# Patient Record
Sex: MALE | Race: WHITE | NOT HISPANIC OR LATINO | Employment: FULL TIME | URBAN - NONMETROPOLITAN AREA
[De-identification: names, ages, dates, MRNs, and addresses within clinical notes are randomized per-mention and may not be internally consistent; named-entity substitution may affect disease eponyms.]

---

## 2021-06-18 ENCOUNTER — HOSPITAL ENCOUNTER (OUTPATIENT)
Dept: GENERAL RADIOLOGY | Facility: HOSPITAL | Age: 36
Discharge: HOME | End: 2021-06-18
Admitting: FAMILY MEDICINE

## 2021-06-18 DIAGNOSIS — R51.9 FACIAL PAIN: ICD-10-CM

## 2021-06-18 PROCEDURE — 70150 X-RAY EXAM OF FACIAL BONES: CPT | Performed by: RADIOLOGY

## 2021-06-18 PROCEDURE — 70150 X-RAY EXAM OF FACIAL BONES: CPT

## 2023-03-27 ENCOUNTER — TRANSCRIBE ORDERS (OUTPATIENT)
Dept: ADMINISTRATIVE | Facility: HOSPITAL | Age: 38
End: 2023-03-27
Payer: COMMERCIAL

## 2023-03-27 DIAGNOSIS — N50.89 TESTICLE LUMP: Primary | ICD-10-CM

## 2024-11-20 ENCOUNTER — OFFICE VISIT (OUTPATIENT)
Dept: ORTHOPEDIC SURGERY | Facility: CLINIC | Age: 39
End: 2024-11-20
Payer: MEDICAID

## 2024-11-20 VITALS
WEIGHT: 254.4 LBS | OXYGEN SATURATION: 95 % | HEIGHT: 77 IN | DIASTOLIC BLOOD PRESSURE: 86 MMHG | SYSTOLIC BLOOD PRESSURE: 132 MMHG | BODY MASS INDEX: 30.04 KG/M2 | HEART RATE: 88 BPM

## 2024-11-20 DIAGNOSIS — M17.12 PRIMARY OSTEOARTHRITIS OF LEFT KNEE: ICD-10-CM

## 2024-11-20 DIAGNOSIS — M25.562 CHRONIC PAIN OF LEFT KNEE: Primary | ICD-10-CM

## 2024-11-20 DIAGNOSIS — M25.462 EFFUSION OF LEFT KNEE: ICD-10-CM

## 2024-11-20 DIAGNOSIS — S00.95XA: ICD-10-CM

## 2024-11-20 DIAGNOSIS — M25.562 KNEE MENISCUS PAIN, LEFT: ICD-10-CM

## 2024-11-20 DIAGNOSIS — G89.29 CHRONIC PAIN OF LEFT KNEE: Primary | ICD-10-CM

## 2024-11-20 RX ORDER — NAPROXEN 500 MG/1
500 TABLET ORAL 2 TIMES DAILY WITH MEALS
Qty: 60 TABLET | Refills: 2 | Status: SHIPPED | OUTPATIENT
Start: 2024-11-20

## 2024-11-20 RX ORDER — LIDOCAINE HYDROCHLORIDE 10 MG/ML
5 INJECTION, SOLUTION EPIDURAL; INFILTRATION; INTRACAUDAL; PERINEURAL
Status: COMPLETED | OUTPATIENT
Start: 2024-11-20 | End: 2024-11-20

## 2024-11-20 RX ORDER — METHYLPREDNISOLONE ACETATE 40 MG/ML
40 INJECTION, SUSPENSION INTRA-ARTICULAR; INTRALESIONAL; INTRAMUSCULAR; SOFT TISSUE
Status: COMPLETED | OUTPATIENT
Start: 2024-11-20 | End: 2024-11-20

## 2024-11-20 RX ADMIN — LIDOCAINE HYDROCHLORIDE 5 ML: 10 INJECTION, SOLUTION EPIDURAL; INFILTRATION; INTRACAUDAL; PERINEURAL at 14:52

## 2024-11-20 RX ADMIN — METHYLPREDNISOLONE ACETATE 40 MG: 40 INJECTION, SUSPENSION INTRA-ARTICULAR; INTRALESIONAL; INTRAMUSCULAR; SOFT TISSUE at 14:52
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
## 2024-11-22 ENCOUNTER — PATIENT ROUNDING (BHMG ONLY) (OUTPATIENT)
Dept: ORTHOPEDIC SURGERY | Facility: CLINIC | Age: 39
End: 2024-11-22
Payer: MEDICAID

## 2024-11-22 NOTE — PROGRESS NOTES
November 22, 2024    Hello, may I speak with Brennan Lao?    My name is AVE      I am  with [facility]  [address]  [facility] [address] [phone].    Before we get started may I verify your date of birth? 1985    I am calling to officially welcome you to our practice and ask about your recent visit. Is this a good time to talk? yes    Tell me about your visit with us. What things went well?  ALL WENT WELL, NO COMPLAINTS       We're always looking for ways to make our patients' experiences even better. Do you have recommendations on ways we may improve?  no    Overall were you satisfied with your first visit to our practice? yes       I appreciate you taking the time to speak with me today. Is there anything else I can do for you? no      Thank you, and have a great day.

## 2024-12-03 ENCOUNTER — OFFICE VISIT (OUTPATIENT)
Dept: ORTHOPEDIC SURGERY | Facility: CLINIC | Age: 39
End: 2024-12-03
Payer: MEDICAID

## 2024-12-03 VITALS
BODY MASS INDEX: 30.06 KG/M2 | HEIGHT: 77 IN | SYSTOLIC BLOOD PRESSURE: 161 MMHG | WEIGHT: 254.6 LBS | HEART RATE: 85 BPM | DIASTOLIC BLOOD PRESSURE: 100 MMHG

## 2024-12-03 DIAGNOSIS — M25.562 KNEE MENISCUS PAIN, LEFT: ICD-10-CM

## 2024-12-03 DIAGNOSIS — M25.462 EFFUSION OF LEFT KNEE: ICD-10-CM

## 2024-12-03 DIAGNOSIS — G89.29 CHRONIC PAIN OF LEFT KNEE: Primary | ICD-10-CM

## 2024-12-03 DIAGNOSIS — M17.12 PRIMARY OSTEOARTHRITIS OF LEFT KNEE: ICD-10-CM

## 2024-12-03 DIAGNOSIS — M25.562 CHRONIC PAIN OF LEFT KNEE: Primary | ICD-10-CM

## 2024-12-03 DIAGNOSIS — S00.95XA: ICD-10-CM

## 2024-12-03 DIAGNOSIS — Z84.0 FAMILY HISTORY OF PSORIASIS IN MOTHER: ICD-10-CM

## 2024-12-03 PROCEDURE — 99214 OFFICE O/P EST MOD 30 MIN: CPT | Performed by: FAMILY MEDICINE

## 2024-12-03 RX ORDER — MELOXICAM 7.5 MG/1
7.5 TABLET ORAL DAILY
Qty: 30 TABLET | Refills: 1 | Status: SHIPPED | OUTPATIENT
Start: 2024-12-03

## 2024-12-03 NOTE — PROGRESS NOTES
"Follow Up Visit      Patient: Brennan Lao  YOB: 1985  Date of Encounter: 12/03/2024  PCP: Rizwana Saha MD  Referring Provider: No ref. provider found     Subjective   Brennan Lao is a 39 y.o. male who presents to the office today for evaluation of Edema, Pain, and Follow-up of the Left Knee      Chief Complaint   Patient presents with    Left Knee - Edema, Pain, Follow-up       HPI  Patient presents for follow-up for left knee pain.  The steroid injection he had at the last visit has been very helpful for the pain but still feels like the knee is in stable and is not moving well.  Notes that the naproxen is not particularly helpful and that he has been taking it a long time.  Knee brace is not helpful.  Notes his mom has a strong history of psoriasis but he does not have any signs of the disease  Patient Active Problem List   Diagnosis    Metal foreign body in head    Primary osteoarthritis of left knee    Chronic pain of left knee       Past Medical History:   Diagnosis Date    Ankle sprain N/A    When I played basketball in [place]    Fracture of ankle N/A    Knee sprain N/A    Low back strain N/A    Stress fracture N/A    Tear of meniscus of knee 2024    Both knees    Tendinitis of knee 2024    Both knee    Wrist sprain     N/a       No Known Allergies    Vitals:   /100 (BP Location: Left arm, Patient Position: Sitting, Cuff Size: Adult)   Pulse 85   Ht 195.6 cm (77.01\")   Wt 115 kg (254 lb 9.6 oz)   BMI 30.18 kg/m²               Visit Vitals  /100 (BP Location: Left arm, Patient Position: Sitting, Cuff Size: Adult)   Pulse 85   Ht 195.6 cm (77.01\")   Wt 115 kg (254 lb 9.6 oz)   BMI 30.18 kg/m²     39 y.o.male  Physical Exam  Vitals and nursing note reviewed.   Constitutional:       General: He is not in acute distress.     Appearance: Normal appearance.   Pulmonary:      Effort: Pulmonary effort is normal. No respiratory distress.   Musculoskeletal:      Left " knee: Bony tenderness and crepitus present. Decreased range of motion. Tenderness present over the medial joint line. No LCL laxity, MCL laxity, ACL laxity or PCL laxity.Abnormal meniscus. Normal pulse.      Instability Tests: Anterior drawer test negative. Posterior drawer test negative. Anterior Lachman test negative. Medial Windy test positive.      Comments: Tender medial patellar facet   Skin:     General: Skin is warm and dry.      Findings: No erythema.   Neurological:      General: No focal deficit present.      Mental Status: He is alert.      Sensory: No sensory deficit.      Motor: No weakness.         Radiology Results:    No radiology results for the last 30 days.    Assessment & Plan   Diagnoses and all orders for this visit:    1. Chronic pain of left knee (Primary)  -     14.3.3 ETA, Rheum. Arthritis; Future  -     CHRIS Comprehensive Plus Profile; Future  -     CBC & Differential; Future  -     Comprehensive Metabolic Panel; Future  -     C-reactive Protein; Future  -     Uric Acid; Future  -     Sedimentation Rate; Future  -     Rheumatoid Factor; Future  -     Cyclic Citrul Peptide Antibody, IgG / IgA; Future  -     meloxicam (MOBIC) 7.5 MG tablet; Take 1 tablet by mouth Daily.  Dispense: 30 tablet; Refill: 1  -     Ambulatory Referral to Physical Therapy for Evaluation & Treatment    2. Primary osteoarthritis of left knee  -     14.3.3 ETA, Rheum. Arthritis; Future  -     CHRIS Comprehensive Plus Profile; Future  -     CBC & Differential; Future  -     Comprehensive Metabolic Panel; Future  -     C-reactive Protein; Future  -     Uric Acid; Future  -     Sedimentation Rate; Future  -     Rheumatoid Factor; Future  -     Cyclic Citrul Peptide Antibody, IgG / IgA; Future  -     meloxicam (MOBIC) 7.5 MG tablet; Take 1 tablet by mouth Daily.  Dispense: 30 tablet; Refill: 1  -     Ambulatory Referral to Physical Therapy for Evaluation & Treatment    3. Metal foreign body in head    4. Knee meniscus  pain, left  -     14.3.3 ETA, Rheum. Arthritis; Future  -     CHRIS Comprehensive Plus Profile; Future  -     CBC & Differential; Future  -     Comprehensive Metabolic Panel; Future  -     C-reactive Protein; Future  -     Uric Acid; Future  -     Sedimentation Rate; Future  -     Rheumatoid Factor; Future  -     Cyclic Citrul Peptide Antibody, IgG / IgA; Future  -     meloxicam (MOBIC) 7.5 MG tablet; Take 1 tablet by mouth Daily.  Dispense: 30 tablet; Refill: 1  -     Ambulatory Referral to Physical Therapy for Evaluation & Treatment    5. Effusion of left knee  -     14.3.3 ETA, Rheum. Arthritis; Future  -     CHRIS Comprehensive Plus Profile; Future  -     CBC & Differential; Future  -     Comprehensive Metabolic Panel; Future  -     C-reactive Protein; Future  -     Uric Acid; Future  -     Sedimentation Rate; Future  -     Rheumatoid Factor; Future  -     Cyclic Citrul Peptide Antibody, IgG / IgA; Future  -     meloxicam (MOBIC) 7.5 MG tablet; Take 1 tablet by mouth Daily.  Dispense: 30 tablet; Refill: 1  -     Ambulatory Referral to Physical Therapy for Evaluation & Treatment    6. Family history of psoriasis in mother  -     14.3.3 ETA, Rheum. Arthritis; Future  -     CHRIS Comprehensive Plus Profile; Future  -     CBC & Differential; Future  -     Comprehensive Metabolic Panel; Future  -     C-reactive Protein; Future  -     Uric Acid; Future  -     Sedimentation Rate; Future  -     Rheumatoid Factor; Future  -     Cyclic Citrul Peptide Antibody, IgG / IgA; Future  -     meloxicam (MOBIC) 7.5 MG tablet; Take 1 tablet by mouth Daily.  Dispense: 30 tablet; Refill: 1  -     Ambulatory Referral to Physical Therapy for Evaluation & Treatment         MEDS ORDERED DURING VISIT:  New Medications Ordered This Visit   Medications    meloxicam (MOBIC) 7.5 MG tablet     Sig: Take 1 tablet by mouth Daily.     Dispense:  30 tablet     Refill:  1     MEDICATION ISSUES:  Discussed medication options and treatment plan of  prescribed medication as well as the risks, benefits, and side effects including potential falls, possible impaired driving and metabolic adversities among others. Patient is agreeable to call the office with any worsening of symptoms or onset of side effects. Patient is agreeable to call 911 or go to the nearest ER should he/she begin having SI/HI.     Discussion:  Patient has arthritis in his knees out of proportion to his age but states that he has been very hard on his body over the course of his life.  Recommend rheumatoid lab workup to rule out secondary causes of arthritis.  Recommend changing naproxen to meloxicam.  Patient would also benefit from viscosupplementation injections to try to help with his arthritic pain and movement.  He will be precertified for these and follow-up with the injection             This document has been electronically signed by Holger Quintana DO   December 3, 2024 15:40 EST    Dictated Utilizing Dragon Dictation: Part of this note may be an electronic transcription/translation of spoken language to printed text using the Dragon Dictation System.

## 2024-12-05 ENCOUNTER — LAB (OUTPATIENT)
Dept: LAB | Facility: HOSPITAL | Age: 39
End: 2024-12-05
Payer: MEDICAID

## 2024-12-05 ENCOUNTER — HOSPITAL ENCOUNTER (OUTPATIENT)
Dept: GENERAL RADIOLOGY | Facility: HOSPITAL | Age: 39
Discharge: HOME | End: 2024-12-05
Payer: MEDICAID

## 2024-12-05 DIAGNOSIS — M25.562 CHRONIC PAIN OF LEFT KNEE: ICD-10-CM

## 2024-12-05 DIAGNOSIS — Z84.0 FAMILY HISTORY OF PSORIASIS IN MOTHER: ICD-10-CM

## 2024-12-05 DIAGNOSIS — M25.462 EFFUSION OF LEFT KNEE: ICD-10-CM

## 2024-12-05 DIAGNOSIS — M25.562 KNEE MENISCUS PAIN, LEFT: ICD-10-CM

## 2024-12-05 DIAGNOSIS — M17.12 PRIMARY OSTEOARTHRITIS OF LEFT KNEE: ICD-10-CM

## 2024-12-05 DIAGNOSIS — M25.562 LEFT KNEE PAIN, UNSPECIFIED CHRONICITY: ICD-10-CM

## 2024-12-05 DIAGNOSIS — G89.29 CHRONIC PAIN OF LEFT KNEE: ICD-10-CM

## 2024-12-05 PROCEDURE — 86235 NUCLEAR ANTIGEN ANTIBODY: CPT

## 2024-12-05 PROCEDURE — 36415 COLL VENOUS BLD VENIPUNCTURE: CPT

## 2024-12-05 PROCEDURE — 85025 COMPLETE CBC W/AUTO DIFF WBC: CPT

## 2024-12-05 PROCEDURE — 83520 IMMUNOASSAY QUANT NOS NONAB: CPT

## 2024-12-05 PROCEDURE — 86431 RHEUMATOID FACTOR QUANT: CPT

## 2024-12-05 PROCEDURE — 84550 ASSAY OF BLOOD/URIC ACID: CPT

## 2024-12-05 PROCEDURE — 73562 X-RAY EXAM OF KNEE 3: CPT

## 2024-12-05 PROCEDURE — 83516 IMMUNOASSAY NONANTIBODY: CPT

## 2024-12-05 PROCEDURE — 85652 RBC SED RATE AUTOMATED: CPT

## 2024-12-05 PROCEDURE — 80053 COMPREHEN METABOLIC PANEL: CPT

## 2024-12-05 PROCEDURE — 86140 C-REACTIVE PROTEIN: CPT

## 2024-12-05 PROCEDURE — 86225 DNA ANTIBODY NATIVE: CPT

## 2024-12-05 PROCEDURE — 86200 CCP ANTIBODY: CPT

## 2024-12-06 ENCOUNTER — TELEPHONE (OUTPATIENT)
Dept: ORTHOPEDIC SURGERY | Facility: CLINIC | Age: 39
End: 2024-12-06
Payer: MEDICAID

## 2024-12-06 LAB
ALBUMIN SERPL-MCNC: 4 G/DL (ref 3.5–5.2)
ALBUMIN/GLOB SERPL: 1.1 G/DL
ALP SERPL-CCNC: 82 U/L (ref 39–117)
ALT SERPL W P-5'-P-CCNC: 74 U/L (ref 1–41)
ANION GAP SERPL CALCULATED.3IONS-SCNC: 11.3 MMOL/L (ref 5–15)
AST SERPL-CCNC: 49 U/L (ref 1–40)
BASOPHILS # BLD AUTO: 0.05 10*3/MM3 (ref 0–0.2)
BASOPHILS NFR BLD AUTO: 0.3 % (ref 0–1.5)
BILIRUB SERPL-MCNC: 1.3 MG/DL (ref 0–1.2)
BUN SERPL-MCNC: 8 MG/DL (ref 6–20)
BUN/CREAT SERPL: 8.4 (ref 7–25)
CALCIUM SPEC-SCNC: 9.4 MG/DL (ref 8.6–10.5)
CHLORIDE SERPL-SCNC: 102 MMOL/L (ref 98–107)
CHROMATIN AB SERPL-ACNC: 10.4 IU/ML (ref 0–14)
CO2 SERPL-SCNC: 27.7 MMOL/L (ref 22–29)
CREAT SERPL-MCNC: 0.95 MG/DL (ref 0.76–1.27)
CRP SERPL-MCNC: 1.17 MG/DL (ref 0–0.5)
DEPRECATED RDW RBC AUTO: 41.8 FL (ref 37–54)
EGFRCR SERPLBLD CKD-EPI 2021: 105.1 ML/MIN/1.73
EOSINOPHIL # BLD AUTO: 0.24 10*3/MM3 (ref 0–0.4)
EOSINOPHIL NFR BLD AUTO: 1.6 % (ref 0.3–6.2)
ERYTHROCYTE [DISTWIDTH] IN BLOOD BY AUTOMATED COUNT: 12.9 % (ref 12.3–15.4)
ERYTHROCYTE [SEDIMENTATION RATE] IN BLOOD: 29 MM/HR (ref 0–15)
GLOBULIN UR ELPH-MCNC: 3.6 GM/DL
GLUCOSE SERPL-MCNC: 92 MG/DL (ref 65–99)
HCT VFR BLD AUTO: 46.9 % (ref 37.5–51)
HGB BLD-MCNC: 16 G/DL (ref 13–17.7)
IMM GRANULOCYTES # BLD AUTO: 0.15 10*3/MM3 (ref 0–0.05)
IMM GRANULOCYTES NFR BLD AUTO: 1 % (ref 0–0.5)
LYMPHOCYTES # BLD AUTO: 3.17 10*3/MM3 (ref 0.7–3.1)
LYMPHOCYTES NFR BLD AUTO: 21 % (ref 19.6–45.3)
MCH RBC QN AUTO: 30.4 PG (ref 26.6–33)
MCHC RBC AUTO-ENTMCNC: 34.1 G/DL (ref 31.5–35.7)
MCV RBC AUTO: 89.2 FL (ref 79–97)
MONOCYTES # BLD AUTO: 1.33 10*3/MM3 (ref 0.1–0.9)
MONOCYTES NFR BLD AUTO: 8.8 % (ref 5–12)
NEUTROPHILS NFR BLD AUTO: 10.17 10*3/MM3 (ref 1.7–7)
NEUTROPHILS NFR BLD AUTO: 67.3 % (ref 42.7–76)
NRBC BLD AUTO-RTO: 0 /100 WBC (ref 0–0.2)
PLATELET # BLD AUTO: 281 10*3/MM3 (ref 140–450)
PMV BLD AUTO: 11.9 FL (ref 6–12)
POTASSIUM SERPL-SCNC: 4.2 MMOL/L (ref 3.5–5.2)
PROT SERPL-MCNC: 7.6 G/DL (ref 6–8.5)
RBC # BLD AUTO: 5.26 10*6/MM3 (ref 4.14–5.8)
SODIUM SERPL-SCNC: 141 MMOL/L (ref 136–145)
URATE SERPL-MCNC: 7.6 MG/DL (ref 3.4–7)
WBC NRBC COR # BLD AUTO: 15.11 10*3/MM3 (ref 3.4–10.8)

## 2024-12-06 NOTE — PROGRESS NOTES
Patient needs a follow-up to discuss his abnormal lab results.  His white blood count is somewhat elevated.  Has he been sick or on steroids recently besides the shot I gave him? Is the knee red hot or swollen? Needs to schedule followup.

## 2024-12-06 NOTE — TELEPHONE ENCOUNTER
Spoke with patient's mother regarding his lab results.  He is unaware of any acute illnesses that can raise his white blood count.  States that the knee is basically at baseline which has been painful and somewhat warm for several years now and is no worse after the recent injection.  He has a stiff when he first wakes up in the morning but it loosens up as he gets moving.  He denies any fevers.  Caution patient to watch for signs of worsening redness and swelling or fevers and he should go to the ER if this occurs.  Otherwise he will follow-up next week for further evaluation.  Discussed that if symptoms persist we will potentially need to aspirate the knee and send the fluid off for evaluation.  Consider treating for gout.  Discussed that his x-ray was WNL.  Patient does not describe any symptoms of acute septic joint at this time.

## 2024-12-07 LAB
CCP IGA+IGG SERPL IA-ACNC: 7 UNITS (ref 0–19)
CENTROMERE B AB SER-ACNC: <0.2 AI (ref 0–0.9)
CHROMATIN AB SERPL-ACNC: <0.2 AI (ref 0–0.9)
DSDNA AB SER-ACNC: 1 IU/ML (ref 0–9)
ENA JO1 AB SER-ACNC: <0.2 AI (ref 0–0.9)
ENA RNP AB SER-ACNC: <0.2 AI (ref 0–0.9)
ENA SCL70 AB SER-ACNC: <0.2 AI (ref 0–0.9)
ENA SM AB SER-ACNC: <0.2 AI (ref 0–0.9)
ENA SM+RNP AB SER-ACNC: <0.2 AI (ref 0–0.9)
ENA SS-A AB SER-ACNC: <0.2 AI (ref 0–0.9)
ENA SS-B AB SER-ACNC: <0.2 AI (ref 0–0.9)
Lab: NORMAL
RIBOSOMAL P AB SER-ACNC: <0.2 AI (ref 0–0.9)

## 2024-12-13 LAB — 14-3-3 ETA AG SER IA-MCNC: <0.2 NG/ML

## 2024-12-20 ENCOUNTER — OFFICE VISIT (OUTPATIENT)
Dept: ORTHOPEDIC SURGERY | Facility: CLINIC | Age: 39
End: 2024-12-20
Payer: MEDICAID

## 2024-12-20 VITALS
DIASTOLIC BLOOD PRESSURE: 81 MMHG | HEART RATE: 89 BPM | SYSTOLIC BLOOD PRESSURE: 144 MMHG | BODY MASS INDEX: 30.1 KG/M2 | OXYGEN SATURATION: 95 % | HEIGHT: 77 IN | WEIGHT: 254.9 LBS

## 2024-12-20 DIAGNOSIS — M25.462 EFFUSION OF LEFT KNEE: ICD-10-CM

## 2024-12-20 DIAGNOSIS — M25.562 KNEE MENISCUS PAIN, LEFT: ICD-10-CM

## 2024-12-20 DIAGNOSIS — R79.89 ELEVATED LFTS: ICD-10-CM

## 2024-12-20 DIAGNOSIS — G89.29 CHRONIC PAIN OF LEFT KNEE: Primary | ICD-10-CM

## 2024-12-20 DIAGNOSIS — M17.12 PRIMARY OSTEOARTHRITIS OF LEFT KNEE: ICD-10-CM

## 2024-12-20 DIAGNOSIS — E79.0 ELEVATED URIC ACID IN BLOOD: ICD-10-CM

## 2024-12-20 DIAGNOSIS — D72.829 LEUKOCYTOSIS, UNSPECIFIED TYPE: ICD-10-CM

## 2024-12-20 DIAGNOSIS — M25.562 CHRONIC PAIN OF LEFT KNEE: Primary | ICD-10-CM

## 2024-12-20 DIAGNOSIS — Z84.0 FAMILY HISTORY OF PSORIASIS IN MOTHER: ICD-10-CM

## 2024-12-20 DIAGNOSIS — S00.95XA: ICD-10-CM

## 2024-12-20 RX ORDER — INDOMETHACIN 50 MG/1
CAPSULE ORAL
Qty: 30 CAPSULE | Refills: 1 | Status: SHIPPED | OUTPATIENT
Start: 2024-12-20

## 2024-12-20 RX ADMIN — LIDOCAINE HYDROCHLORIDE 5 ML: 10 INJECTION, SOLUTION EPIDURAL; INFILTRATION; INTRACAUDAL; PERINEURAL at 11:06

## 2025-01-01 RX ORDER — LIDOCAINE HYDROCHLORIDE 10 MG/ML
5 INJECTION, SOLUTION EPIDURAL; INFILTRATION; INTRACAUDAL; PERINEURAL
Status: COMPLETED | OUTPATIENT
Start: 2024-12-20 | End: 2024-12-20

## 2025-01-02 NOTE — PROGRESS NOTES
"Follow Up Visit      Patient: Brennan Lao  YOB: 1985  Date of Encounter: 12/20/2024  PCP: Rizwana Saha MD  Referring Provider: No ref. provider found     Subjective   Brennan Lao is a 39 y.o. male who presents to the office today for evaluation of Follow-up, Edema, and Pain of the Left Knee      Chief Complaint   Patient presents with    Left Knee - Follow-up, Edema, Pain       HPI  Patient presents for follow-up for left knee pain he states is overall unchanged.  Desires to try Durolane injection if possible.  Got his lab results over the phone but needs to go over them in more detail  Patient Active Problem List   Diagnosis    Metal foreign body in head    Primary osteoarthritis of left knee    Chronic pain of left knee       Past Medical History:   Diagnosis Date    Ankle sprain N/A    When I played basketball in [employer]    Fracture of ankle N/A    Knee sprain N/A    Low back strain N/A    Stress fracture N/A    Tear of meniscus of knee 2024    Both knees    Tendinitis of knee 2024    Both knee    Wrist sprain     N/a       No Known Allergies    Vitals:   /81 (BP Location: Left arm, Patient Position: Sitting, Cuff Size: Adult)   Pulse 89   Ht 195.6 cm (77.01\")   Wt 116 kg (254 lb 14.4 oz)   SpO2 95%   BMI 30.22 kg/m²               Visit Vitals  /81 (BP Location: Left arm, Patient Position: Sitting, Cuff Size: Adult)   Pulse 89   Ht 195.6 cm (77.01\")   Wt 116 kg (254 lb 14.4 oz)   SpO2 95%   BMI 30.22 kg/m²     39 y.o.male  Physical Exam  Vitals and nursing note reviewed.   Constitutional:       General: He is not in acute distress.     Appearance: Normal appearance.   Pulmonary:      Effort: Pulmonary effort is normal. No respiratory distress.   Musculoskeletal:      Left knee: Bony tenderness and crepitus present. Decreased range of motion. Tenderness present over the medial joint line. No LCL laxity, MCL laxity, ACL laxity or PCL laxity.Abnormal meniscus. " Normal pulse.      Instability Tests: Anterior drawer test negative. Posterior drawer test negative. Anterior Lachman test negative. Medial Windy test positive.      Comments: Tender medial patellar facet   Skin:     General: Skin is warm and dry.      Findings: No erythema.   Neurological:      General: No focal deficit present.      Mental Status: He is alert.      Sensory: No sensory deficit.      Motor: No weakness.     Exam unchanged from previous visit    Radiology Results:    XR Knee 3 View Left    Result Date: 12/5/2024    Normal left knee x-rays.  This report was finalized on 12/5/2024 1:41 PM by Dr. Kole Sargent MD.       Labs 12/5/2024,   sed rate 29 high, uric acid 7.6 mildly high, CRP 1.17 high, rheumatoid lab workup WNL including CHRIS, 14.3.3 ETA, anti-CCP antibodies and rheumatoid factor  CBC w/diff          12/5/2024    13:20   CBC w/Diff   WBC 15.11    RBC 5.26    Hemoglobin 16.0    Hematocrit 46.9    MCV 89.2    MCH 30.4    MCHC 34.1    RDW 12.9    Platelets 281    Neutrophil Rel % 67.3    Immature Granulocyte Rel % 1.0    Lymphocyte Rel % 21.0    Monocyte Rel % 8.8    Eosinophil Rel % 1.6    Basophil Rel % 0.3      Lab Results   Component Value Date    GLUCOSE 92 12/05/2024    BUN 8 12/05/2024    CREATININE 0.95 12/05/2024     12/05/2024    K 4.2 12/05/2024     12/05/2024    CALCIUM 9.4 12/05/2024    PROTEINTOT 7.6 12/05/2024    ALBUMIN 4.0 12/05/2024    ALT 74 (H) 12/05/2024    AST 49 (H) 12/05/2024    ALKPHOS 82 12/05/2024    BILITOT 1.3 (H) 12/05/2024    GLOB 3.6 12/05/2024    AGRATIO 1.1 12/05/2024    BCR 8.4 12/05/2024    ANIONGAP 11.3 12/05/2024    EGFR 105.1 12/05/2024         - Large Joint Arthrocentesis: L knee on 12/20/2024 11:06 AM  Indications: pain  Details: 20 G needle, anterolateral approach  Medications: 5 mL lidocaine PF 1% 1 %; 60 mg Sodium Hyaluronate 60 MG/3ML  Outcome: tolerated well, no immediate complications    Injection site on the lateral knee was cleaned  with alcohol and iodine and anesthesia was provided with ethyl chloride spray.  Then 5 cc of 1% lidocaine without epinephrine was injected into the subcutaneous tissues and joint using a 1.5 inch 25-gauge needle for anesthesia.  Then using sterile technique and a 20-gauge 1.5 inch needle the knee joint was accessed and 60mg of Durolane was injected.  The injection site was covered with sterile bandage.  There is no adverse effects and negligible  blood loss.  Follow-up care and precautions were discussed.      Consent was given by the patient. Immediately prior to procedure a time out was called to verify the correct patient, procedure, equipment, support staff and site/side marked as required. Patient was prepped and draped in the usual sterile fashion.          Assessment & Plan   Diagnoses and all orders for this visit:    1. Chronic pain of left knee (Primary)  -     indomethacin (INDOCIN) 50 MG capsule; PRN medicine for gout flares. Take 1 capsule q8h for 3-5 days at onset of gout flare. Discontinue once pain is well controlled. Hold meloxicam while taking indomethacin  Dispense: 30 capsule; Refill: 1  -     CBC & Differential; Future  -     Comprehensive Metabolic Panel; Future  -     Uric Acid; Future  -     Sedimentation Rate; Future  -     C-reactive Protein; Future    2. Primary osteoarthritis of left knee  -     indomethacin (INDOCIN) 50 MG capsule; PRN medicine for gout flares. Take 1 capsule q8h for 3-5 days at onset of gout flare. Discontinue once pain is well controlled. Hold meloxicam while taking indomethacin  Dispense: 30 capsule; Refill: 1  -     CBC & Differential; Future  -     Comprehensive Metabolic Panel; Future  -     Uric Acid; Future  -     Sedimentation Rate; Future  -     C-reactive Protein; Future    3. Knee meniscus pain, left  -     indomethacin (INDOCIN) 50 MG capsule; PRN medicine for gout flares. Take 1 capsule q8h for 3-5 days at onset of gout flare. Discontinue once pain is well  controlled. Hold meloxicam while taking indomethacin  Dispense: 30 capsule; Refill: 1  -     CBC & Differential; Future  -     Comprehensive Metabolic Panel; Future  -     Uric Acid; Future  -     Sedimentation Rate; Future  -     C-reactive Protein; Future    4. Effusion of left knee  -     indomethacin (INDOCIN) 50 MG capsule; PRN medicine for gout flares. Take 1 capsule q8h for 3-5 days at onset of gout flare. Discontinue once pain is well controlled. Hold meloxicam while taking indomethacin  Dispense: 30 capsule; Refill: 1  -     CBC & Differential; Future  -     Comprehensive Metabolic Panel; Future  -     Uric Acid; Future  -     Sedimentation Rate; Future  -     C-reactive Protein; Future    5. Family history of psoriasis in mother  -     indomethacin (INDOCIN) 50 MG capsule; PRN medicine for gout flares. Take 1 capsule q8h for 3-5 days at onset of gout flare. Discontinue once pain is well controlled. Hold meloxicam while taking indomethacin  Dispense: 30 capsule; Refill: 1  -     CBC & Differential; Future  -     Comprehensive Metabolic Panel; Future  -     Uric Acid; Future  -     Sedimentation Rate; Future  -     C-reactive Protein; Future    6. Metal foreign body in head  -     indomethacin (INDOCIN) 50 MG capsule; PRN medicine for gout flares. Take 1 capsule q8h for 3-5 days at onset of gout flare. Discontinue once pain is well controlled. Hold meloxicam while taking indomethacin  Dispense: 30 capsule; Refill: 1  -     CBC & Differential; Future  -     Comprehensive Metabolic Panel; Future  -     Uric Acid; Future  -     Sedimentation Rate; Future  -     C-reactive Protein; Future    7. Elevated uric acid in blood  -     indomethacin (INDOCIN) 50 MG capsule; PRN medicine for gout flares. Take 1 capsule q8h for 3-5 days at onset of gout flare. Discontinue once pain is well controlled. Hold meloxicam while taking indomethacin  Dispense: 30 capsule; Refill: 1  -     CBC & Differential; Future  -      Comprehensive Metabolic Panel; Future  -     Uric Acid; Future  -     Sedimentation Rate; Future  -     C-reactive Protein; Future    8. Leukocytosis, unspecified type  -     indomethacin (INDOCIN) 50 MG capsule; PRN medicine for gout flares. Take 1 capsule q8h for 3-5 days at onset of gout flare. Discontinue once pain is well controlled. Hold meloxicam while taking indomethacin  Dispense: 30 capsule; Refill: 1  -     CBC & Differential; Future  -     Comprehensive Metabolic Panel; Future  -     Uric Acid; Future  -     Sedimentation Rate; Future  -     C-reactive Protein; Future    9. Elevated LFTs  -     CBC & Differential; Future  -     Comprehensive Metabolic Panel; Future  -     Uric Acid; Future  -     Sedimentation Rate; Future  -     C-reactive Protein; Future         MEDS ORDERED DURING VISIT:  New Medications Ordered This Visit   Medications    indomethacin (INDOCIN) 50 MG capsule     Sig: PRN medicine for gout flares. Take 1 capsule q8h for 3-5 days at onset of gout flare. Discontinue once pain is well controlled. Hold meloxicam while taking indomethacin     Dispense:  30 capsule     Refill:  1     MEDICATION ISSUES:  Discussed medication options and treatment plan of prescribed medication as well as the risks, benefits, and side effects including potential falls, possible impaired driving and metabolic adversities among others. Patient is agreeable to call the office with any worsening of symptoms or onset of side effects. Patient is agreeable to call 911 or go to the nearest ER should he/she begin having SI/HI.     Discussion:  Patient unsure of cause of elevated white blood count but it could have been related to his previous steroid injection.  Denies any illness    Uric acid somewhat elevated so we will try course of indomethacin to see if this is of any benefit.  Consider allopurinol.  Patient will hold other NSAIDs while on indomethacin    LFTs and inflammatory markers mildly elevated  Rheumatoid  workup WNL  Recommend rechecking all labs in 1 month    Patient received a Durolane injection in the left knee today to help with pain related to degenerative issues.  Will follow-up in 1 month after doing blood work                     This document has been electronically signed by Holger Quintana DO   January 1, 2025 23:01 EST    Dictated Utilizing Dragon Dictation: Part of this note may be an electronic transcription/translation of spoken language to printed text using the Dragon Dictation System.

## 2025-05-07 ENCOUNTER — TELEPHONE (OUTPATIENT)
Dept: ORTHOPEDIC SURGERY | Facility: CLINIC | Age: 40
End: 2025-05-07
Payer: MEDICAID

## 2025-05-07 NOTE — TELEPHONE ENCOUNTER
Patient came in asking Holli  about being seen. Patient then seen me opening door to get another patient and ran up to me asking me if I remembered him and said that his knee that he had an injection in on 12/2024 was red, hot to touch, very painful. I spoke with Dr. Quintana and he said to put him on schedule. Holli offered him an appointment today and he said he could not be seen today due to working and asked to be seen Friday. Holli made the appointment 05/09/2025.

## 2025-05-09 ENCOUNTER — LAB (OUTPATIENT)
Dept: LAB | Facility: HOSPITAL | Age: 40
End: 2025-05-09
Payer: MEDICAID

## 2025-05-09 ENCOUNTER — OFFICE VISIT (OUTPATIENT)
Dept: ORTHOPEDIC SURGERY | Facility: CLINIC | Age: 40
End: 2025-05-09
Payer: MEDICAID

## 2025-05-09 VITALS
WEIGHT: 261.6 LBS | DIASTOLIC BLOOD PRESSURE: 94 MMHG | HEIGHT: 77 IN | SYSTOLIC BLOOD PRESSURE: 167 MMHG | OXYGEN SATURATION: 97 % | HEART RATE: 84 BPM | BODY MASS INDEX: 30.89 KG/M2

## 2025-05-09 DIAGNOSIS — M25.472 LEFT ANKLE SWELLING: ICD-10-CM

## 2025-05-09 DIAGNOSIS — G89.29 CHRONIC PAIN OF LEFT KNEE: ICD-10-CM

## 2025-05-09 DIAGNOSIS — Z84.0 FAMILY HISTORY OF PSORIASIS IN MOTHER: ICD-10-CM

## 2025-05-09 DIAGNOSIS — M17.12 PRIMARY OSTEOARTHRITIS OF LEFT KNEE: ICD-10-CM

## 2025-05-09 DIAGNOSIS — M25.572 ACUTE LEFT ANKLE PAIN: Primary | ICD-10-CM

## 2025-05-09 DIAGNOSIS — M25.562 CHRONIC PAIN OF LEFT KNEE: ICD-10-CM

## 2025-05-09 DIAGNOSIS — M25.462 EFFUSION OF LEFT KNEE: ICD-10-CM

## 2025-05-09 DIAGNOSIS — R79.89 ELEVATED LFTS: ICD-10-CM

## 2025-05-09 DIAGNOSIS — E79.0 ELEVATED URIC ACID IN BLOOD: ICD-10-CM

## 2025-05-09 DIAGNOSIS — D72.829 LEUKOCYTOSIS, UNSPECIFIED TYPE: ICD-10-CM

## 2025-05-09 DIAGNOSIS — M25.562 KNEE MENISCUS PAIN, LEFT: ICD-10-CM

## 2025-05-09 DIAGNOSIS — S00.95XA: ICD-10-CM

## 2025-05-09 PROCEDURE — 36415 COLL VENOUS BLD VENIPUNCTURE: CPT

## 2025-05-09 PROCEDURE — 84550 ASSAY OF BLOOD/URIC ACID: CPT

## 2025-05-09 PROCEDURE — 86140 C-REACTIVE PROTEIN: CPT

## 2025-05-09 PROCEDURE — 85025 COMPLETE CBC W/AUTO DIFF WBC: CPT

## 2025-05-09 PROCEDURE — 85652 RBC SED RATE AUTOMATED: CPT

## 2025-05-09 PROCEDURE — 80053 COMPREHEN METABOLIC PANEL: CPT

## 2025-05-09 PROCEDURE — 99214 OFFICE O/P EST MOD 30 MIN: CPT | Performed by: FAMILY MEDICINE

## 2025-05-09 RX ORDER — MELOXICAM 15 MG/1
15 TABLET ORAL DAILY
Qty: 30 TABLET | Refills: 2 | Status: SHIPPED | OUTPATIENT
Start: 2025-05-09

## 2025-05-09 RX ORDER — PREDNISONE 20 MG/1
TABLET ORAL
Qty: 19 TABLET | Refills: 0 | Status: SHIPPED | OUTPATIENT
Start: 2025-05-09 | End: 2025-05-19

## 2025-05-09 NOTE — PROGRESS NOTES
"Follow Up Visit      Patient: Brennan Lao  YOB: 1985  Date of Encounter: 05/09/2025  PCP: Rizwana Saha MD  Referring Provider: No ref. provider found     Subjective   Brennan Lao is a 40 y.o. male who presents to the office today for evaluation of Follow-up, Edema, and Pain of the Left Knee      Chief Complaint   Patient presents with    Left Knee - Follow-up, Edema, Pain       History of Present Illness  The patient presents for follow-up of chronic left knee pain and swelling, which has flared up after he started back to work about a week ago.    He reports no new injury. He expresses concern about potential cartilage damage in his left knee, drawing parallels to previous injuries in his other knee. He has been unable to get an MRI due to retained metallic fragments in his eyes. The pain is severe enough to prevent him from working and earning a living, prompting him to seek immediate intervention. He found some relief from steroids and Durolane injection last year, but the pain did not completely resolve and flared up again nearly immediately after resuming work.    FAMILY HISTORY  There is a family history of gout.    Patient Active Problem List   Diagnosis    Metal foreign body in head    Primary osteoarthritis of left knee    Chronic pain of left knee       Past Medical History:   Diagnosis Date    Ankle sprain N/A    When I played basketball in [employer]    Fracture of ankle N/A    Knee sprain N/A    Low back strain N/A    Stress fracture N/A    Tear of meniscus of knee 2024    Both knees    Tendinitis of knee 2024    Both knee    Wrist sprain     N/a       No Known Allergies    Vitals:   /94 (BP Location: Right arm, Patient Position: Sitting, Cuff Size: Adult)   Pulse 84   Ht 195.6 cm (77.01\")   Wt 119 kg (261 lb 9.6 oz)   SpO2 97%   BMI 31.01 kg/m²           Visit Vitals  /94 (BP Location: Right arm, Patient Position: Sitting, Cuff Size: Adult)   Pulse 84 " "  Ht 195.6 cm (77.01\")   Wt 119 kg (261 lb 9.6 oz)   SpO2 97%   BMI 31.01 kg/m²     40 y.o.male    Physical Exam    Physical Exam  Vitals and nursing note reviewed.   Constitutional:       General: He is not in acute distress.     Appearance: Normal appearance.   Pulmonary:      Effort: Pulmonary effort is normal. No respiratory distress.   Musculoskeletal:      Left knee: Effusion (trace), bony tenderness and crepitus present. Decreased range of motion. Tenderness present over the medial joint line. No LCL laxity, MCL laxity, ACL laxity or PCL laxity.Abnormal meniscus. Normal pulse.      Instability Tests: Anterior drawer test negative. Posterior drawer test negative. Anterior Lachman test negative. Medial Windy test positive.      Comments: Tender medial patellar facet    1+ edema of the ankle without pitting   Skin:     General: Skin is warm and dry.      Findings: No erythema.   Neurological:      General: No focal deficit present.      Mental Status: He is alert.      Sensory: No sensory deficit.      Motor: No weakness.         Radiology Results:    No radiology results for the last 30 days.    Results    Diagnoses and all orders for this visit:    1. Acute left ankle pain (Primary)  -     predniSONE (DELTASONE) 20 MG tablet; Take 3 tablets by mouth Daily for 4 days, THEN 2 tablets Daily for 2 days, THEN 1 tablet Daily for 2 days, THEN 0.5 tablets Daily for 2 days.  Dispense: 19 tablet; Refill: 0    2. Chronic pain of left knee  -     meloxicam (MOBIC) 15 MG tablet; Take 1 tablet by mouth Daily.  Dispense: 30 tablet; Refill: 2  -     Ambulatory Referral to Orthopedic Surgery  -     predniSONE (DELTASONE) 20 MG tablet; Take 3 tablets by mouth Daily for 4 days, THEN 2 tablets Daily for 2 days, THEN 1 tablet Daily for 2 days, THEN 0.5 tablets Daily for 2 days.  Dispense: 19 tablet; Refill: 0    3. Primary osteoarthritis of left knee  -     meloxicam (MOBIC) 15 MG tablet; Take 1 tablet by mouth Daily.  " Dispense: 30 tablet; Refill: 2  -     Ambulatory Referral to Orthopedic Surgery  -     predniSONE (DELTASONE) 20 MG tablet; Take 3 tablets by mouth Daily for 4 days, THEN 2 tablets Daily for 2 days, THEN 1 tablet Daily for 2 days, THEN 0.5 tablets Daily for 2 days.  Dispense: 19 tablet; Refill: 0    4. Knee meniscus pain, left  -     meloxicam (MOBIC) 15 MG tablet; Take 1 tablet by mouth Daily.  Dispense: 30 tablet; Refill: 2  -     Ambulatory Referral to Orthopedic Surgery  -     predniSONE (DELTASONE) 20 MG tablet; Take 3 tablets by mouth Daily for 4 days, THEN 2 tablets Daily for 2 days, THEN 1 tablet Daily for 2 days, THEN 0.5 tablets Daily for 2 days.  Dispense: 19 tablet; Refill: 0    5. Effusion of left knee  -     meloxicam (MOBIC) 15 MG tablet; Take 1 tablet by mouth Daily.  Dispense: 30 tablet; Refill: 2  -     Ambulatory Referral to Orthopedic Surgery  -     predniSONE (DELTASONE) 20 MG tablet; Take 3 tablets by mouth Daily for 4 days, THEN 2 tablets Daily for 2 days, THEN 1 tablet Daily for 2 days, THEN 0.5 tablets Daily for 2 days.  Dispense: 19 tablet; Refill: 0    6. Family history of psoriasis in mother  -     meloxicam (MOBIC) 15 MG tablet; Take 1 tablet by mouth Daily.  Dispense: 30 tablet; Refill: 2  -     Ambulatory Referral to Orthopedic Surgery  -     predniSONE (DELTASONE) 20 MG tablet; Take 3 tablets by mouth Daily for 4 days, THEN 2 tablets Daily for 2 days, THEN 1 tablet Daily for 2 days, THEN 0.5 tablets Daily for 2 days.  Dispense: 19 tablet; Refill: 0    7. Left ankle swelling  -     predniSONE (DELTASONE) 20 MG tablet; Take 3 tablets by mouth Daily for 4 days, THEN 2 tablets Daily for 2 days, THEN 1 tablet Daily for 2 days, THEN 0.5 tablets Daily for 2 days.  Dispense: 19 tablet; Refill: 0        Assessment & Plan  1. Chronic left knee pain.  He continues to experience chronic, predominantly medial knee pain. The physical examination suggests a potential meniscus tear; however, this can  not be confirmed through imaging due to the inability to perform an MRI. His symptoms appear to be primarily associated with probable medial meniscus damage. An increase in the dosage of Mobic and a course of prednisone are recommended to alleviate his knee pain and leg swelling. He has declined the option of injection or aspiration during today's visit. A referral to orthopedic surgery has been made for further evaluation and potential surgical intervention to accurately diagnose and treat the issue.    2. Elevated uric acid.  He had previously mildly elevated uric acid levels and a family history of gout. It is recommended that he undergoes lab tests again to monitor his uric acid levels.    PROCEDURE  The patient received a Durolane injection last year.       MEDS ORDERED DURING VISIT:  New Medications Ordered This Visit   Medications    meloxicam (MOBIC) 15 MG tablet     Sig: Take 1 tablet by mouth Daily.     Dispense:  30 tablet     Refill:  2    predniSONE (DELTASONE) 20 MG tablet     Sig: Take 3 tablets by mouth Daily for 4 days, THEN 2 tablets Daily for 2 days, THEN 1 tablet Daily for 2 days, THEN 0.5 tablets Daily for 2 days.     Dispense:  19 tablet     Refill:  0     MEDICATION ISSUES:  Discussed medication options and treatment plan of prescribed medication as well as the risks, benefits, and side effects including potential falls, possible impaired driving and metabolic adversities among others. Patient is agreeable to call the office with any worsening of symptoms or onset of side effects. Patient is agreeable to call 911 or go to the nearest ER should he/she begin having SI/HI.     Discussion:  Recommend patient hold Mobic while taking prednisone if possible      This document has been electronically signed by Holger Quintana DO   May 9, 2025 15:41 EDT

## 2025-05-10 LAB
ALBUMIN SERPL-MCNC: 4.6 G/DL (ref 3.5–5.2)
ALBUMIN/GLOB SERPL: 1.4 G/DL
ALP SERPL-CCNC: 80 U/L (ref 39–117)
ALT SERPL W P-5'-P-CCNC: 37 U/L (ref 1–41)
ANION GAP SERPL CALCULATED.3IONS-SCNC: 11 MMOL/L (ref 5–15)
AST SERPL-CCNC: 23 U/L (ref 1–40)
BASOPHILS # BLD AUTO: 0.11 10*3/MM3 (ref 0–0.2)
BASOPHILS NFR BLD AUTO: 0.8 % (ref 0–1.5)
BILIRUB SERPL-MCNC: 0.7 MG/DL (ref 0–1.2)
BUN SERPL-MCNC: 11 MG/DL (ref 6–20)
BUN/CREAT SERPL: 11.8 (ref 7–25)
CALCIUM SPEC-SCNC: 9.6 MG/DL (ref 8.6–10.5)
CHLORIDE SERPL-SCNC: 101 MMOL/L (ref 98–107)
CO2 SERPL-SCNC: 28 MMOL/L (ref 22–29)
CREAT SERPL-MCNC: 0.93 MG/DL (ref 0.76–1.27)
CRP SERPL-MCNC: 0.48 MG/DL (ref 0–0.5)
DEPRECATED RDW RBC AUTO: 42.4 FL (ref 37–54)
EGFRCR SERPLBLD CKD-EPI 2021: 107.1 ML/MIN/1.73
EOSINOPHIL # BLD AUTO: 0.45 10*3/MM3 (ref 0–0.4)
EOSINOPHIL NFR BLD AUTO: 3.2 % (ref 0.3–6.2)
ERYTHROCYTE [DISTWIDTH] IN BLOOD BY AUTOMATED COUNT: 12.9 % (ref 12.3–15.4)
ERYTHROCYTE [SEDIMENTATION RATE] IN BLOOD: 19 MM/HR (ref 0–15)
GLOBULIN UR ELPH-MCNC: 3.3 GM/DL
GLUCOSE SERPL-MCNC: 50 MG/DL (ref 65–99)
HCT VFR BLD AUTO: 47.9 % (ref 37.5–51)
HGB BLD-MCNC: 15.7 G/DL (ref 13–17.7)
IMM GRANULOCYTES # BLD AUTO: 0.05 10*3/MM3 (ref 0–0.05)
IMM GRANULOCYTES NFR BLD AUTO: 0.4 % (ref 0–0.5)
LYMPHOCYTES # BLD AUTO: 4.76 10*3/MM3 (ref 0.7–3.1)
LYMPHOCYTES NFR BLD AUTO: 34 % (ref 19.6–45.3)
MCH RBC QN AUTO: 29.4 PG (ref 26.6–33)
MCHC RBC AUTO-ENTMCNC: 32.8 G/DL (ref 31.5–35.7)
MCV RBC AUTO: 89.7 FL (ref 79–97)
MONOCYTES # BLD AUTO: 1.37 10*3/MM3 (ref 0.1–0.9)
MONOCYTES NFR BLD AUTO: 9.8 % (ref 5–12)
NEUTROPHILS NFR BLD AUTO: 51.8 % (ref 42.7–76)
NEUTROPHILS NFR BLD AUTO: 7.27 10*3/MM3 (ref 1.7–7)
NRBC BLD AUTO-RTO: 0 /100 WBC (ref 0–0.2)
PLATELET # BLD AUTO: 304 10*3/MM3 (ref 140–450)
PMV BLD AUTO: 11.4 FL (ref 6–12)
POTASSIUM SERPL-SCNC: 4.2 MMOL/L (ref 3.5–5.2)
PROT SERPL-MCNC: 7.9 G/DL (ref 6–8.5)
RBC # BLD AUTO: 5.34 10*6/MM3 (ref 4.14–5.8)
SODIUM SERPL-SCNC: 140 MMOL/L (ref 136–145)
URATE SERPL-MCNC: 6.7 MG/DL (ref 3.4–7)
WBC NRBC COR # BLD AUTO: 14.01 10*3/MM3 (ref 3.4–10.8)

## 2025-05-28 ENCOUNTER — HOSPITAL ENCOUNTER (OUTPATIENT)
Facility: HOSPITAL | Age: 40
Discharge: HOME | End: 2025-05-28
Payer: MEDICAID

## 2025-05-28 ENCOUNTER — OFFICE VISIT (OUTPATIENT)
Age: 40
End: 2025-05-28
Payer: MEDICAID

## 2025-05-28 VITALS
OXYGEN SATURATION: 97 % | BODY MASS INDEX: 30.92 KG/M2 | SYSTOLIC BLOOD PRESSURE: 138 MMHG | HEART RATE: 83 BPM | WEIGHT: 261.9 LBS | DIASTOLIC BLOOD PRESSURE: 88 MMHG | HEIGHT: 77 IN

## 2025-05-28 DIAGNOSIS — M25.472 PAIN AND SWELLING OF LEFT ANKLE: ICD-10-CM

## 2025-05-28 DIAGNOSIS — D72.829 LEUKOCYTOSIS, UNSPECIFIED TYPE: ICD-10-CM

## 2025-05-28 DIAGNOSIS — E79.0 ELEVATED URIC ACID IN BLOOD: ICD-10-CM

## 2025-05-28 DIAGNOSIS — M25.572 PAIN AND SWELLING OF LEFT ANKLE: ICD-10-CM

## 2025-05-28 DIAGNOSIS — M25.562 CHRONIC PAIN OF LEFT KNEE: ICD-10-CM

## 2025-05-28 DIAGNOSIS — R79.89 ELEVATED LFTS: ICD-10-CM

## 2025-05-28 DIAGNOSIS — M25.572 PAIN AND SWELLING OF LEFT ANKLE: Primary | ICD-10-CM

## 2025-05-28 DIAGNOSIS — M17.12 PRIMARY OSTEOARTHRITIS OF LEFT KNEE: ICD-10-CM

## 2025-05-28 DIAGNOSIS — G89.29 CHRONIC PAIN OF LEFT KNEE: ICD-10-CM

## 2025-05-28 DIAGNOSIS — M25.472 PAIN AND SWELLING OF LEFT ANKLE: Primary | ICD-10-CM

## 2025-05-28 PROCEDURE — 73610 X-RAY EXAM OF ANKLE: CPT

## 2025-05-28 PROCEDURE — 73630 X-RAY EXAM OF FOOT: CPT

## 2025-05-28 RX ORDER — NABUMETONE 500 MG/1
500-1000 TABLET, FILM COATED ORAL 2 TIMES DAILY PRN
Qty: 120 TABLET | Refills: 1 | Status: SHIPPED | OUTPATIENT
Start: 2025-05-28

## 2025-05-28 NOTE — PROGRESS NOTES
"Follow Up Visit      Patient: Brennan Lao  YOB: 1985  Date of Encounter: 05/28/2025  PCP: Rizwana Saha MD  Referring Provider: No ref. provider found     Subjective   Brennan Lao is a 40 y.o. male who presents to the office today for evaluation of Knee Pain      Chief Complaint   Patient presents with    Knee Pain       History of Present Illness  The patient presents for a follow-up of left ankle pain and swelling.    He was placed on a prednisone taper a month ago after the onset of swelling and pain without injury. The ankle pain and swelling did not improve with the prednisone taper. Three days ago, he stepped off his porch, which is about a 1-foot drop, and felt immediate pain in his lateral foot that shot up the ankle into the leg. It was very painful initially, and he had trouble walking. It is somewhat better today, but still painful and swollen, though he is walking better. He is due to see an orthopedic surgeon for his knee in about 2 weeks.    Patient Active Problem List   Diagnosis    Metal foreign body in head    Primary osteoarthritis of left knee    Chronic pain of left knee       Past Medical History:   Diagnosis Date    Ankle sprain N/A    When I played basketball in [employer]    Fracture of ankle N/A    Knee sprain N/A    Low back strain N/A    Stress fracture N/A    Tear of meniscus of knee 2024    Both knees    Tendinitis of knee 2024    Both knee    Wrist sprain     N/a       No Known Allergies    Vitals:   /88 (BP Location: Right arm, Patient Position: Sitting, Cuff Size: Adult)   Pulse 83   Ht 195.6 cm (77.01\")   Wt 119 kg (261 lb 14.4 oz)   SpO2 97%   BMI 31.05 kg/m²           Visit Vitals  /88 (BP Location: Right arm, Patient Position: Sitting, Cuff Size: Adult)   Pulse 83   Ht 195.6 cm (77.01\")   Wt 119 kg (261 lb 14.4 oz)   SpO2 97%   BMI 31.05 kg/m²     40 y.o.male        Physical Exam  The left lower extremity is neurovascularly " intact. Deep ankle pain is noted on end range dorsiflexion which is restricted. The proximal fibula and lateral malleolus are nontender. The medial malleolus, anterior ankle mortise, navicular, and ATFL are tender. The proximal fifth metatarsal head, calcaneus, and Achilles tendon are nontender and intact. Strength and sensation are intact. Negative talar tilt and anterior drawer test.  Physical Exam  Vitals and nursing note reviewed.   Constitutional:       General: He is not in acute distress.     Appearance: Normal appearance.   Pulmonary:      Effort: Pulmonary effort is normal. No respiratory distress.   Musculoskeletal:      Left ankle: Swelling present. Decreased range of motion.      Comments: Mild edema of lateral ankle.  Oh   Skin:     General: Skin is warm and dry.      Findings: No erythema.   Neurological:      General: No focal deficit present.      Mental Status: He is alert.      Sensory: No sensory deficit.      Motor: No weakness.         Radiology Results:    No radiology results for the last 30 days.  X-ray left foot and ankle: 5/28/2025-my preliminary interpretation  No acute fractures or bony abnormalities.  Degenerative changes of the ankle mortise joint.  Awaiting final radiologist interpretation    Results  Laboratory Studies  One of the two inflammatory markers had normalized and the other had decreased to near normal. Uric acid has normalized. Mildly elevated LFTs have normalized. White blood count continues to be mildly elevated.    Labs:  .Uric acid 6.7, sed rate 19 high, CRP 0.48    CBC w/diff          12/5/2024    13:20 5/9/2025    14:33   CBC w/Diff   WBC 15.11  14.01    RBC 5.26  5.34    Hemoglobin 16.0  15.7    Hematocrit 46.9  47.9    MCV 89.2  89.7    MCH 30.4  29.4    MCHC 34.1  32.8    RDW 12.9  12.9    Platelets 281  304    Neutrophil Rel % 67.3  51.8    Immature Granulocyte Rel % 1.0  0.4    Lymphocyte Rel % 21.0  34.0    Monocyte Rel % 8.8  9.8    Eosinophil Rel % 1.6  3.2     Basophil Rel % 0.3  0.8      Lab Results   Component Value Date    GLUCOSE 50 (L) 05/09/2025    BUN 11 05/09/2025    CREATININE 0.93 05/09/2025     05/09/2025    K 4.2 05/09/2025     05/09/2025    CALCIUM 9.6 05/09/2025    PROTEINTOT 7.9 05/09/2025    ALBUMIN 4.6 05/09/2025    ALT 37 05/09/2025    AST 23 05/09/2025    ALKPHOS 80 05/09/2025    BILITOT 0.7 05/09/2025    GLOB 3.3 05/09/2025    AGRATIO 1.4 05/09/2025    BCR 11.8 05/09/2025    ANIONGAP 11.0 05/09/2025    EGFR 107.1 05/09/2025     Diagnoses and all orders for this visit:    1. Pain and swelling of left ankle (Primary)  -     Cancel: XR Ankle 3+ View Left; Future  -     XR Ankle 3+ View Left; Future  -     XR Foot 3+ View Left; Future  -     nabumetone (RELAFEN) 500 MG tablet; Take 1-2 tablets by mouth 2 (Two) Times a Day As Needed for Moderate Pain.  Dispense: 120 tablet; Refill: 1    2. Primary osteoarthritis of left knee  -     nabumetone (RELAFEN) 500 MG tablet; Take 1-2 tablets by mouth 2 (Two) Times a Day As Needed for Moderate Pain.  Dispense: 120 tablet; Refill: 1    3. Chronic pain of left knee  -     nabumetone (RELAFEN) 500 MG tablet; Take 1-2 tablets by mouth 2 (Two) Times a Day As Needed for Moderate Pain.  Dispense: 120 tablet; Refill: 1    4. Leukocytosis, unspecified type    5. Elevated uric acid in blood    6. Elevated LFTs        Assessment & Plan  1. Left ankle pain and swelling.  The patient reports that the pain and swelling began without injury and did not improve with a prednisone taper started a month ago. Three days ago, he experienced immediate pain in his lateral foot that shot up the ankle into the leg after stepping off his porch. Physical examination reveals deep ankle pain on end-range dorsiflexion, tenderness in the medial malleolus, anterior ankle mortise, navicular, and ATFL. His recent blood work shows normalization of uric acid and LFTs, but a persistently elevated white blood count. X-rays of the left  ankle have been recommended to further evaluate the condition.  X-rays show degenerative changes but no fractures.  Patient will wear a lace up ankle brace for comfort and is given home exercise program for the ankle.  We will discontinue Mobic which has not been very effective and try nabumetone instead.  Patient will take 1 pill twice daily for 1 to 2 weeks and if not improving may increase it to 2 pills twice daily.    2. Elevated white blood cell count.  The patient's white blood cell count remains mildly elevated since it was checked 6 months ago. He is advised to follow up with his primary care physician to discuss this persistently elevated white blood count and monitor it.     3.  Chronic left knee pain: Patient is due to see orthopedic surgeon in 2 weeks for evaluation  MEDS ORDERED DURING VISIT:  No orders of the defined types were placed in this encounter.    MEDICATION ISSUES:  Discussed medication options and treatment plan of prescribed medication as well as the risks, benefits, and side effects including potential falls, possible impaired driving and metabolic adversities among others. Patient is agreeable to call the office with any worsening of symptoms or onset of side effects. Patient is agreeable to call 911 or go to the nearest ER should he/she begin having SI/HI.     Discussion:        This document has been electronically signed by Holger Quintana DO   May 28, 2025 08:51 EDT

## 2025-06-03 DIAGNOSIS — M25.562 LEFT KNEE PAIN, UNSPECIFIED CHRONICITY: Primary | ICD-10-CM

## 2025-06-11 ENCOUNTER — OFFICE VISIT (OUTPATIENT)
Dept: ORTHOPEDIC SURGERY | Facility: CLINIC | Age: 40
End: 2025-06-11
Payer: MEDICAID

## 2025-06-11 ENCOUNTER — HOSPITAL ENCOUNTER (OUTPATIENT)
Dept: GENERAL RADIOLOGY | Facility: HOSPITAL | Age: 40
Discharge: HOME | End: 2025-06-11
Admitting: ORTHOPAEDIC SURGERY
Payer: MEDICAID

## 2025-06-11 VITALS
HEART RATE: 72 BPM | WEIGHT: 262.35 LBS | BODY MASS INDEX: 30.98 KG/M2 | SYSTOLIC BLOOD PRESSURE: 137 MMHG | DIASTOLIC BLOOD PRESSURE: 85 MMHG | HEIGHT: 77 IN

## 2025-06-11 DIAGNOSIS — M23.92 LOCKING OF LEFT KNEE: ICD-10-CM

## 2025-06-11 DIAGNOSIS — S83.207A POSITIVE MCMURRAY TEST OF LEFT KNEE, INITIAL ENCOUNTER: Primary | ICD-10-CM

## 2025-06-11 DIAGNOSIS — M25.562 LEFT KNEE PAIN, UNSPECIFIED CHRONICITY: ICD-10-CM

## 2025-06-11 PROCEDURE — 73562 X-RAY EXAM OF KNEE 3: CPT

## 2025-06-11 RX ORDER — CHLORHEXIDINE GLUCONATE 500 MG/1
CLOTH TOPICAL SEE ADMIN INSTRUCTIONS
OUTPATIENT
Start: 2025-06-11

## 2025-06-11 RX ORDER — NAPROXEN SODIUM 220 MG/1
220 TABLET, FILM COATED ORAL 2 TIMES DAILY PRN
COMMUNITY

## 2025-06-11 RX ORDER — IBUPROFEN 200 MG
200 TABLET ORAL EVERY 6 HOURS PRN
COMMUNITY

## 2025-06-11 NOTE — H&P (VIEW-ONLY)
Follow-up Visit         Patient: Brennan Lao  YOB: 1985  Date of Encounter: 06/11/2025      Chief  Complaint:   Chief Complaint   Patient presents with    Left Knee - Pain, Edema, Initial Evaluation         HPI:  Brennan Lao, 40 y.o. male presents for evaluation of left knee pain chronic.  He sustained his first knee injury approximately 8 years ago.  He twisted his knee severe pain struggled for several months but eventually improved to where that he could function.  Approximately 4 years ago he reinjured his knee and again symptoms returned and increased in severity.  Approximately 18 months ago he was sustained third injury to his left knee he now describes severe pain unable to bear weight and describes a locking sensation both with ambulation but also at rest frequently when he wakes in the morning he has trouble with his left knee locked and with manipulation he can eventually free his knee.  He has received physical therapy on several occasions without improvement he continues to try to do motion and strengthening exercises.  He has received both intra-articular steroid injection and intra-articular viscous injection both with limited response.  He is unable to undergo MRI left knee because of metal in his eye.  Past medical history is remarkable for multiple injuries to his right knee in the past but he remains functional.        Medical History:  Patient Active Problem List   Diagnosis    Metal foreign body in head    Primary osteoarthritis of left knee    Chronic pain of left knee    Elevated uric acid in blood    Leukocytosis     Past Medical History:   Diagnosis Date    Ankle sprain N/A    When I played basketball in [employer]    Fracture of ankle N/A    Knee sprain N/A    Low back strain N/A    Stress fracture N/A    Tear of meniscus of knee 2024    Both knees    Tendinitis of knee 2024    Both knee    Wrist sprain     N/a           Social History:  Social History  "    Socioeconomic History    Marital status: Other   Tobacco Use    Smoking status: Every Day     Current packs/day: 0.25     Average packs/day: 0.3 packs/day for 14.4 years (3.6 ttl pk-yrs)     Types: Cigarettes     Start date: 2011    Smokeless tobacco: Current     Types: Snuff   Vaping Use    Vaping status: Never Used   Substance and Sexual Activity    Alcohol use: Never    Drug use: Never    Sexual activity: Yes     Partners: Female     Birth control/protection: None           Current Medications:    Current Outpatient Medications:     ibuprofen (ADVIL,MOTRIN) 200 MG tablet, Take 1 tablet by mouth Every 6 (Six) Hours As Needed for Mild Pain., Disp: , Rfl:     naproxen sodium (ALEVE) 220 MG tablet, Take 1 tablet by mouth 2 (Two) Times a Day As Needed., Disp: , Rfl:     indomethacin (INDOCIN) 50 MG capsule, PRN medicine for gout flares. Take 1 capsule q8h for 3-5 days at onset of gout flare. Discontinue once pain is well controlled. Hold meloxicam while taking indomethacin (Patient not taking: Reported on 5/28/2025), Disp: 30 capsule, Rfl: 1    nabumetone (RELAFEN) 500 MG tablet, Take 1-2 tablets by mouth 2 (Two) Times a Day As Needed for Moderate Pain., Disp: 120 tablet, Rfl: 1        Allergies:  No Known Allergies        Family History:  Family History   Problem Relation Age of Onset    Osteoporosis Mother     Cancer Maternal Grandfather            Surgical History:  Past Surgical History:   Procedure Laterality Date    ACHILLES TENDON SURGERY  N/a    Right ankle    ANKLE OPEN REDUCTION INTERNAL FIXATION  N/A    Right ankle    FOOT SURGERY  N/A    KNEE SURGERY  2018    Right knee         Vitals:  Vitals:    06/11/25 0826   BP: 137/85   Pulse: 72   Weight: 119 kg (262 lb 5.6 oz)   Height: 195.6 cm (77.01\")     Body mass index is 31.1 kg/m².        Radiology:   XR Knee 3 View Left  Result Date: 6/11/2025  1.  No fracture or dislocation. 2.  Mild medial knee compartment osteoarthritis.  This report was finalized on " 6/11/2025 9:05 AM by Dr. Vaughn Foss MD.      XR Foot 3+ View Left  Result Date: 5/28/2025    No acute findings in the left foot.  This report was finalized on 5/28/2025 10:02 AM by Dr. Chin Lobo MD.      XR Ankle 3+ View Left  Result Date: 5/28/2025    No acute findings in the left ankle.  This report was finalized on 5/28/2025 9:59 AM by Dr. Chin Lobo MD.            Radiographs knee by my review show minimal medial joint space narrowing slight squaring of medial femoral condyle otherwise unremarkable.          Orthopedic Examination: Right knee demonstrates moderate effusion marked posterior medial joint line tenderness with strongly positive Windy.  He has no gross instability with varus valgus stressing Lachman or drawer his extension is full flexion beyond 90 degrees with moderate pain due to effusion.  Neurovascular exam is grossly intact.          Assessment & Plan:   40 y.o. male presents with acute on chronic left knee complaints presentation today strongly suspicious for medial meniscus tear given his description of locking requires manipulation to free and strongly positive Windy.  We discussed options he is here to proceed with proposed left knee arthroscopy partial medial meniscectomy and possible chondroplasty.  We discussed risk and benefits.  He is scheduled [facility] left knee arthroscopic partial medial meniscectomy possible chondroplasty.          No diagnosis found.      Procedures        Cc:  Rizwana Saha MD              This document has been electronically signed by Sonny Booth MD   June 11, 2025 09:09 EDT

## 2025-06-11 NOTE — PROGRESS NOTES
History & Physical      Patient: Brennan Lao  YOB: 1985  Date of Encounter: 06/11/2025        Chief Complaint:   Chief Complaint   Patient presents with    Left Knee - Pain, Edema, Initial Evaluation           HPI:   Brennan Lao, 40 y.o. male presents for evaluation of left knee pain chronic.  He sustained his first knee injury approximately 8 years ago.  He twisted his knee severe pain struggled for several months but eventually improved to where that he could function.  Approximately 4 years ago he reinjured his knee and again symptoms returned and increased in severity.  Approximately 18 months ago he was sustained third injury to his left knee he now describes severe pain unable to bear weight and describes a locking sensation both with ambulation but also at rest frequently when he wakes in the morning he has trouble with his left knee locked and with manipulation he can eventually free his knee.  He has received physical therapy on several occasions without improvement he continues to try to do motion and strengthening exercises.  He has received both intra-articular steroid injection and intra-articular viscous injection both with limited response.  He is unable to undergo MRI left knee because of metal in his eye.  Past medical history is remarkable for multiple injuries to his right knee in the past but he remains functional.         Active Problem List:  Patient Active Problem List   Diagnosis    Metal foreign body in head    Primary osteoarthritis of left knee    Chronic pain of left knee    Elevated uric acid in blood    Leukocytosis    Locking of left knee    Windy sign present in left knee           Past Medical History:  Past Medical History:   Diagnosis Date    Ankle sprain N/A    When I played basketball in [employer] high    Fracture of ankle N/A    Knee sprain N/A    Low back strain N/A    Stress fracture N/A    Tear of meniscus of knee 2024    Both knees    Tendinitis of knee  2024    Both knee    Wrist sprain     N/a           Past Surgical History:  Past Surgical History:   Procedure Laterality Date    ACHILLES TENDON SURGERY  N/a    Right ankle    ANKLE OPEN REDUCTION INTERNAL FIXATION  N/A    Right ankle    FOOT SURGERY  N/A    KNEE SURGERY  2018    Right knee           Family History:  Family History   Problem Relation Age of Onset    Osteoporosis Mother     Cancer Maternal Grandfather          Social History:  Social History     Socioeconomic History    Marital status: Other   Tobacco Use    Smoking status: Every Day     Current packs/day: 0.25     Average packs/day: 0.3 packs/day for 14.4 years (3.6 ttl pk-yrs)     Types: Cigarettes     Start date: 2011    Smokeless tobacco: Current     Types: Snuff   Vaping Use    Vaping status: Never Used   Substance and Sexual Activity    Alcohol use: Never    Drug use: Never    Sexual activity: Yes     Partners: Female     Birth control/protection: None     Body mass index is 31.1 kg/m².      Medications:  Current Outpatient Medications   Medication Sig Dispense Refill    ibuprofen (ADVIL,MOTRIN) 200 MG tablet Take 1 tablet by mouth Every 6 (Six) Hours As Needed for Mild Pain.      naproxen sodium (ALEVE) 220 MG tablet Take 1 tablet by mouth 2 (Two) Times a Day As Needed.      indomethacin (INDOCIN) 50 MG capsule PRN medicine for gout flares. Take 1 capsule q8h for 3-5 days at onset of gout flare. Discontinue once pain is well controlled. Hold meloxicam while taking indomethacin (Patient not taking: Reported on 5/28/2025) 30 capsule 1    nabumetone (RELAFEN) 500 MG tablet Take 1-2 tablets by mouth 2 (Two) Times a Day As Needed for Moderate Pain. 120 tablet 1     No current facility-administered medications for this visit.         Allergies:  No Known Allergies      Review of Systems:   Review of Systems   Constitutional: Negative.  Negative for chills, fatigue and fever.   HENT: Negative.  Negative for congestion, facial swelling, mouth sores,  sore throat, trouble swallowing and voice change.    Eyes: Negative.  Negative for pain, discharge and visual disturbance.   Respiratory: Negative.  Negative for apnea, cough, chest tightness, shortness of breath and wheezing.    Cardiovascular: Negative.  Negative for chest pain, palpitations and leg swelling.   Gastrointestinal: Negative.  Negative for abdominal distention, abdominal pain, anal bleeding, constipation, diarrhea, nausea and vomiting.   Endocrine: Negative.  Negative for cold intolerance, heat intolerance, polyphagia and polyuria.   Genitourinary: Negative.  Negative for difficulty urinating, dysuria, flank pain and hematuria.   Musculoskeletal:  Positive for arthralgias and joint swelling.   Skin: Negative.  Negative for color change, pallor, rash and wound.   Allergic/Immunologic: Negative.  Negative for environmental allergies, food allergies and immunocompromised state.   Neurological:  Positive for numbness. Negative for dizziness, tremors, seizures, syncope, facial asymmetry, speech difficulty, weakness, light-headedness and headaches.   Hematological: Negative.  Negative for adenopathy. Does not bruise/bleed easily.   Psychiatric/Behavioral: Negative.  Negative for behavioral problems, confusion, dysphoric mood, self-injury, sleep disturbance and suicidal ideas. The patient is not nervous/anxious.          Physical Exam:   Physical Exam  Vitals and nursing note reviewed.   Constitutional:       General: He is not in acute distress.     Appearance: He is well-developed. He is not diaphoretic.   HENT:      Head: Normocephalic and atraumatic.      Right Ear: External ear normal.      Left Ear: External ear normal.   Eyes:      General:         Right eye: No discharge.         Left eye: No discharge.      Conjunctiva/sclera: Conjunctivae normal.      Pupils: Pupils are equal, round, and reactive to light.   Neck:      Thyroid: No thyromegaly.      Vascular: No JVD.   Cardiovascular:      Rate and  "Rhythm: Normal rate and regular rhythm.      Heart sounds: Normal heart sounds. No murmur heard.     No friction rub. No gallop.   Pulmonary:      Effort: Pulmonary effort is normal. No respiratory distress.      Breath sounds: Normal breath sounds. No wheezing or rales.   Chest:      Chest wall: No tenderness.   Abdominal:      General: Bowel sounds are normal. There is no distension.      Palpations: Abdomen is soft.      Tenderness: There is no guarding.   Musculoskeletal:      Cervical back: Normal range of motion and neck supple.   Lymphadenopathy:      Cervical: No cervical adenopathy.   Skin:     General: Skin is warm and dry.      Capillary Refill: Capillary refill takes less than 2 seconds.   Neurological:      Mental Status: He is alert and oriented to person, place, and time.   Psychiatric:         Behavior: Behavior normal.         Thought Content: Thought content normal.         Judgment: Judgment normal.     GENERAL: 40 y.o. male, alert and oriented X 3 in no acute distress.   Visit Vitals  /85   Pulse 72   Ht 195.6 cm (77.01\")   Wt 119 kg (262 lb 5.6 oz)   BMI 31.10 kg/m²       GENERAL APPEARANCE: Awake, alert & oriented, in no acute distress and well developed, well nourished.   PSYCH: Normal mood and affect  LUNGS: Breathing nonlabored, no wheezing  EYES: Sclera anicteric, pupils equal  CARDIOVASCULAR: Palpable pulses. Capillary refill less than 2 seconds  INTEGUMENTARY: Skin intact, co clubbing, cyanosis  NEUROLOGIC: Normal Sensation  MUSCULOSKELETAL:  Orthopedic Examination:  Right knee demonstrates moderate effusion marked posterior medial joint line tenderness with strongly positive Windy. He has no gross instability with varus valgus stressing Lachman or drawer his extension is full flexion beyond 90 degrees with moderate pain due to effusion. Neurovascular exam is grossly intact.         Radiology/Labs:     XR Knee 3 View Left  Result Date: 6/11/2025  1.  No fracture or dislocation. " 2.  Mild medial knee compartment osteoarthritis.  This report was finalized on 6/11/2025 9:05 AM by Dr. Vaughn Foss MD.      XR Foot 3+ View Left  Result Date: 5/28/2025    No acute findings in the left foot.  This report was finalized on 5/28/2025 10:02 AM by Dr. Chin Lobo MD.      XR Ankle 3+ View Left  Result Date: 5/28/2025    No acute findings in the left ankle.  This report was finalized on 5/28/2025 9:59 AM by Dr. Chin Lobo MD.             Radiographs knee by my review show minimal medial joint space narrowing slight squaring of medial femoral condyle otherwise unremarkable.              Assessment & Plan:   40 y.o. male presents with acute on chronic left knee complaints presentation today strongly suspicious for medial meniscus tear given his description of locking requires manipulation to free and strongly positive Windy.  We discussed options he is here to proceed with proposed left knee arthroscopy partial medial meniscectomy and possible chondroplasty.  We discussed risk and benefits.  He is scheduled [facility] left knee arthroscopic partial medial meniscectomy possible chondroplasty.              ICD-10-CM ICD-9-CM   1. Positive Windy test of left knee, initial encounter  S83.207A 836.2   2. Locking of left knee  M23.92 717.9         Cc:   Rizwana Saha MD                This document has been electronically signed by Sonny Booth MD   June 12, 2025 20:24 EDT

## 2025-06-11 NOTE — PROGRESS NOTES
Follow-up Visit         Patient: Brennan Lao  YOB: 1985  Date of Encounter: 06/11/2025      Chief  Complaint:   Chief Complaint   Patient presents with    Left Knee - Pain, Edema, Initial Evaluation         HPI:  Brennan Lao, 40 y.o. male presents for evaluation of left knee pain chronic.  He sustained his first knee injury approximately 8 years ago.  He twisted his knee severe pain struggled for several months but eventually improved to where that he could function.  Approximately 4 years ago he reinjured his knee and again symptoms returned and increased in severity.  Approximately 18 months ago he was sustained third injury to his left knee he now describes severe pain unable to bear weight and describes a locking sensation both with ambulation but also at rest frequently when he wakes in the morning he has trouble with his left knee locked and with manipulation he can eventually free his knee.  He has received physical therapy on several occasions without improvement he continues to try to do motion and strengthening exercises.  He has received both intra-articular steroid injection and intra-articular viscous injection both with limited response.  He is unable to undergo MRI left knee because of metal in his eye.  Past medical history is remarkable for multiple injuries to his right knee in the past but he remains functional.        Medical History:  Patient Active Problem List   Diagnosis    Metal foreign body in head    Primary osteoarthritis of left knee    Chronic pain of left knee    Elevated uric acid in blood    Leukocytosis     Past Medical History:   Diagnosis Date    Ankle sprain N/A    When I played basketball in [employer]    Fracture of ankle N/A    Knee sprain N/A    Low back strain N/A    Stress fracture N/A    Tear of meniscus of knee 2024    Both knees    Tendinitis of knee 2024    Both knee    Wrist sprain     N/a           Social History:  Social History  "    Socioeconomic History    Marital status: Other   Tobacco Use    Smoking status: Every Day     Current packs/day: 0.25     Average packs/day: 0.3 packs/day for 14.4 years (3.6 ttl pk-yrs)     Types: Cigarettes     Start date: 2011    Smokeless tobacco: Current     Types: Snuff   Vaping Use    Vaping status: Never Used   Substance and Sexual Activity    Alcohol use: Never    Drug use: Never    Sexual activity: Yes     Partners: Female     Birth control/protection: None           Current Medications:    Current Outpatient Medications:     ibuprofen (ADVIL,MOTRIN) 200 MG tablet, Take 1 tablet by mouth Every 6 (Six) Hours As Needed for Mild Pain., Disp: , Rfl:     naproxen sodium (ALEVE) 220 MG tablet, Take 1 tablet by mouth 2 (Two) Times a Day As Needed., Disp: , Rfl:     indomethacin (INDOCIN) 50 MG capsule, PRN medicine for gout flares. Take 1 capsule q8h for 3-5 days at onset of gout flare. Discontinue once pain is well controlled. Hold meloxicam while taking indomethacin (Patient not taking: Reported on 5/28/2025), Disp: 30 capsule, Rfl: 1    nabumetone (RELAFEN) 500 MG tablet, Take 1-2 tablets by mouth 2 (Two) Times a Day As Needed for Moderate Pain., Disp: 120 tablet, Rfl: 1        Allergies:  No Known Allergies        Family History:  Family History   Problem Relation Age of Onset    Osteoporosis Mother     Cancer Maternal Grandfather            Surgical History:  Past Surgical History:   Procedure Laterality Date    ACHILLES TENDON SURGERY  N/a    Right ankle    ANKLE OPEN REDUCTION INTERNAL FIXATION  N/A    Right ankle    FOOT SURGERY  N/A    KNEE SURGERY  2018    Right knee         Vitals:  Vitals:    06/11/25 0826   BP: 137/85   Pulse: 72   Weight: 119 kg (262 lb 5.6 oz)   Height: 195.6 cm (77.01\")     Body mass index is 31.1 kg/m².        Radiology:   XR Knee 3 View Left  Result Date: 6/11/2025  1.  No fracture or dislocation. 2.  Mild medial knee compartment osteoarthritis.  This report was finalized on " 6/11/2025 9:05 AM by Dr. Vaughn Foss MD.      XR Foot 3+ View Left  Result Date: 5/28/2025    No acute findings in the left foot.  This report was finalized on 5/28/2025 10:02 AM by Dr. Chin Lobo MD.      XR Ankle 3+ View Left  Result Date: 5/28/2025    No acute findings in the left ankle.  This report was finalized on 5/28/2025 9:59 AM by Dr. Chin Lobo MD.            Radiographs knee by my review show minimal medial joint space narrowing slight squaring of medial femoral condyle otherwise unremarkable.          Orthopedic Examination: Right knee demonstrates moderate effusion marked posterior medial joint line tenderness with strongly positive Windy.  He has no gross instability with varus valgus stressing Lachman or drawer his extension is full flexion beyond 90 degrees with moderate pain due to effusion.  Neurovascular exam is grossly intact.          Assessment & Plan:   40 y.o. male presents with acute on chronic left knee complaints presentation today strongly suspicious for medial meniscus tear given his description of locking requires manipulation to free and strongly positive Windy.  We discussed options he is here to proceed with proposed left knee arthroscopy partial medial meniscectomy and possible chondroplasty.  We discussed risk and benefits.  He is scheduled [facility] left knee arthroscopic partial medial meniscectomy possible chondroplasty.          No diagnosis found.      Procedures        Cc:  Rizwana Saha MD              This document has been electronically signed by Sonny Booth MD   June 11, 2025 09:09 EDT

## 2025-06-25 NOTE — DISCHARGE INSTRUCTIONS
HOLD all diabetic medications the morning of surgery as ordered by physician.    Please discontinue all blood thinners and anticoagulants (except aspirin) prior to surgery as per your surgeon and cardiologist instructions.  Aspirin may be continued up to the day prior to surgery.     CHLORHEXIDINE CLOTHS GIVEN WITH INSTRUCTIONS AND FORM TO RETURN TO HOSPITAL, IF APPLICABLE.    General Instructions:  Do not eat or drink after midnight: 6/30/25 includes water, mints, or gum. You may brush your teeth.  Dental appliances that are removable must be taken out day of surgery.  Do not smoke, chew tobacco, or drink alcohol 24 hours prior to surgery.  Bring medications in original bottles, any inhalers and if applicable your C-PAP/BI-PAP machine.  Bring any papers given to you in the doctor's office.  Wear clean comfortable clothes and socks.  Do not wear contact lenses or make-up. Bring a case for your glasses if applicable.  Bring crutches or walker if applicable.  Leave all other valuables and jewelry at home.    If you were given a blood bank ID arm band remember to bring it with you the day of surgery.    Preventing a Surgical Site Infection:  Shower the night before surgery (unless instructed other wise) using a fresh bar of anti-bacterial soap (such as Dial) and clean washcloth. Dry with a clean towel and dress in clean clothing.  For 2 to 3 days before surgery, avoid shaving with a razor near where you will have surgery because the razor can irritate skin and make it easier to develop an infection. Ask your surgeon if you will be receiving antibiotics prior to surgery.  Make sure you, your family, and all healthcare providers clear their hands with soap and water or an alcohol-based hand  before caring for you or your wound.  If at all possible, quit smoking as many days before surgery as you can.    Day of surgery:  Upon arrival, a Pre-op nurse and Anesthesiologist will review your health history, obtain  vital signs, and answer questions you may have. The only belongings needed at this time will be your home medications and if applicable your C-PAP/BI-PAP machine. If you are staying overnight your family can leave the rest of your belongings in the car and bring them to your room later. A Pre-op nurse will start an IV and you may receive medication in preparation for surgery, including something to help you relax. Your family will be able to see you in the Pre-op area. While you are in surgery your family should notify the waiting room  if they leave the waiting room area and provide a contact phone number.    Please be aware that surgery does come with discomfort. We want to make every effort to control your discomfort so please discuss any uncontrolled symptoms with your nurse. Your doctor will most likely have prescribed pain medications.    If you are going home after surgery you will receive individualized written care instructions before being discharged. A responsible adult must drive you to and from the hospital on the day of surgery and stay with you for 24 hours.    If you are staying overnight following surgery, you will be transported to your hospital room following the recovery period.     If you have any questions please call Pre-Admission Testing at [phone] ext [phone] Monday-Friday 8:00-3:00  Deductibles and co-payments are collected on the day of service. Please be prepared to pay the required co-pay, deductible or deposit on the day of service as defined by your plan.    A RESPONSIBLE PERSON MUST REMAIN IN THE WAITING ROOM DURING YOUR PROCEDURE AND A RESPONSIBLE  MUST BE AVAILABLE UPON YOUR DISCHARGE.

## 2025-06-27 ENCOUNTER — PRE-ADMISSION TESTING (OUTPATIENT)
Dept: PREADMISSION TESTING | Facility: HOSPITAL | Age: 40
End: 2025-06-27
Payer: MEDICAID

## 2025-06-27 DIAGNOSIS — M23.92 LOCKING OF LEFT KNEE: ICD-10-CM

## 2025-06-27 DIAGNOSIS — S83.207A POSITIVE MCMURRAY TEST OF LEFT KNEE, INITIAL ENCOUNTER: ICD-10-CM

## 2025-06-27 LAB
ANION GAP SERPL CALCULATED.3IONS-SCNC: 12.6 MMOL/L (ref 5–15)
BUN SERPL-MCNC: 7.3 MG/DL (ref 6–20)
BUN/CREAT SERPL: 8.6 (ref 7–25)
CALCIUM SPEC-SCNC: 9 MG/DL (ref 8.6–10.5)
CHLORIDE SERPL-SCNC: 102 MMOL/L (ref 98–107)
CO2 SERPL-SCNC: 25.4 MMOL/L (ref 22–29)
CREAT SERPL-MCNC: 0.85 MG/DL (ref 0.76–1.27)
DEPRECATED RDW RBC AUTO: 36.8 FL (ref 37–54)
EGFRCR SERPLBLD CKD-EPI 2021: 113.4 ML/MIN/1.73
ERYTHROCYTE [DISTWIDTH] IN BLOOD BY AUTOMATED COUNT: 11.6 % (ref 12.3–15.4)
GLUCOSE SERPL-MCNC: 90 MG/DL (ref 65–99)
HCT VFR BLD AUTO: 44.8 % (ref 37.5–51)
HGB BLD-MCNC: 14.8 G/DL (ref 13–17.7)
MCH RBC QN AUTO: 29.2 PG (ref 26.6–33)
MCHC RBC AUTO-ENTMCNC: 33 G/DL (ref 31.5–35.7)
MCV RBC AUTO: 88.5 FL (ref 79–97)
PLATELET # BLD AUTO: 388 10*3/MM3 (ref 140–450)
PMV BLD AUTO: 10.4 FL (ref 6–12)
POTASSIUM SERPL-SCNC: 3.6 MMOL/L (ref 3.5–5.2)
RBC # BLD AUTO: 5.06 10*6/MM3 (ref 4.14–5.8)
SODIUM SERPL-SCNC: 140 MMOL/L (ref 136–145)
WBC NRBC COR # BLD AUTO: 15.44 10*3/MM3 (ref 3.4–10.8)

## 2025-06-27 PROCEDURE — 36415 COLL VENOUS BLD VENIPUNCTURE: CPT

## 2025-06-27 PROCEDURE — 85027 COMPLETE CBC AUTOMATED: CPT

## 2025-06-27 PROCEDURE — 80048 BASIC METABOLIC PNL TOTAL CA: CPT

## 2025-07-01 ENCOUNTER — APPOINTMENT (OUTPATIENT)
Dept: GENERAL RADIOLOGY | Facility: HOSPITAL | Age: 40
End: 2025-07-01
Payer: MEDICAID

## 2025-07-01 ENCOUNTER — ANESTHESIA EVENT (OUTPATIENT)
Dept: PERIOP | Facility: HOSPITAL | Age: 40
End: 2025-07-01
Payer: MEDICAID

## 2025-07-01 ENCOUNTER — HOSPITAL ENCOUNTER (OUTPATIENT)
Facility: HOSPITAL | Age: 40
Setting detail: HOSPITAL OUTPATIENT SURGERY
Discharge: HOME | End: 2025-07-01
Attending: ORTHOPAEDIC SURGERY | Admitting: ORTHOPAEDIC SURGERY
Payer: MEDICAID

## 2025-07-01 ENCOUNTER — ANESTHESIA (OUTPATIENT)
Dept: PERIOP | Facility: HOSPITAL | Age: 40
End: 2025-07-01
Payer: MEDICAID

## 2025-07-01 VITALS
TEMPERATURE: 97.6 F | OXYGEN SATURATION: 99 % | DIASTOLIC BLOOD PRESSURE: 88 MMHG | SYSTOLIC BLOOD PRESSURE: 135 MMHG | BODY MASS INDEX: 29.4 KG/M2 | RESPIRATION RATE: 18 BRPM | WEIGHT: 249 LBS | HEIGHT: 77 IN | HEART RATE: 65 BPM

## 2025-07-01 DIAGNOSIS — M23.92 LOCKING OF LEFT KNEE: ICD-10-CM

## 2025-07-01 DIAGNOSIS — S83.207A POSITIVE MCMURRAY TEST OF LEFT KNEE, INITIAL ENCOUNTER: ICD-10-CM

## 2025-07-01 PROCEDURE — 25010000002 BUPIVACAINE (PF) 0.5 % SOLUTION 10 ML VIAL: Performed by: ORTHOPAEDIC SURGERY

## 2025-07-01 PROCEDURE — 29881 ARTHRS KNE SRG MNISECTMY M/L: CPT | Performed by: ORTHOPAEDIC SURGERY

## 2025-07-01 PROCEDURE — 25010000002 ONDANSETRON PER 1 MG: Performed by: NURSE ANESTHETIST, CERTIFIED REGISTERED

## 2025-07-01 PROCEDURE — 25010000002 MIDAZOLAM PER 1 MG: Performed by: NURSE ANESTHETIST, CERTIFIED REGISTERED

## 2025-07-01 PROCEDURE — 25010000002 FENTANYL CITRATE (PF) 50 MCG/ML SOLUTION: Performed by: NURSE ANESTHETIST, CERTIFIED REGISTERED

## 2025-07-01 PROCEDURE — 25810000003 LACTATED RINGERS PER 1000 ML: Performed by: ANESTHESIOLOGY

## 2025-07-01 PROCEDURE — 25010000002 LIDOCAINE 1 % SOLUTION 20 ML VIAL: Performed by: ORTHOPAEDIC SURGERY

## 2025-07-01 PROCEDURE — 25010000002 DEXAMETHASONE PER 1 MG: Performed by: NURSE ANESTHETIST, CERTIFIED REGISTERED

## 2025-07-01 PROCEDURE — 25010000002 KETOROLAC TROMETHAMINE PER 15 MG: Performed by: NURSE ANESTHETIST, CERTIFIED REGISTERED

## 2025-07-01 PROCEDURE — 25010000002 PROPOFOL 200 MG/20ML EMULSION: Performed by: NURSE ANESTHETIST, CERTIFIED REGISTERED

## 2025-07-01 PROCEDURE — 25010000002 FAMOTIDINE (PF) 20 MG/2ML SOLUTION: Performed by: NURSE ANESTHETIST, CERTIFIED REGISTERED

## 2025-07-01 PROCEDURE — 25810000003 LACTATED RINGERS PER 1000 ML: Performed by: NURSE ANESTHETIST, CERTIFIED REGISTERED

## 2025-07-01 RX ORDER — FENTANYL CITRATE 50 UG/ML
INJECTION, SOLUTION INTRAMUSCULAR; INTRAVENOUS AS NEEDED
Status: DISCONTINUED | OUTPATIENT
Start: 2025-07-01 | End: 2025-07-01 | Stop reason: SURG

## 2025-07-01 RX ORDER — KETOROLAC TROMETHAMINE 30 MG/ML
INJECTION, SOLUTION INTRAMUSCULAR; INTRAVENOUS AS NEEDED
Status: DISCONTINUED | OUTPATIENT
Start: 2025-07-01 | End: 2025-07-01 | Stop reason: SURG

## 2025-07-01 RX ORDER — SODIUM CHLORIDE, SODIUM LACTATE, POTASSIUM CHLORIDE, CALCIUM CHLORIDE 600; 310; 30; 20 MG/100ML; MG/100ML; MG/100ML; MG/100ML
INJECTION, SOLUTION INTRAVENOUS CONTINUOUS PRN
Status: DISCONTINUED | OUTPATIENT
Start: 2025-07-01 | End: 2025-07-01 | Stop reason: SURG

## 2025-07-01 RX ORDER — SODIUM CHLORIDE 0.9 % (FLUSH) 0.9 %
10 SYRINGE (ML) INJECTION AS NEEDED
Status: DISCONTINUED | OUTPATIENT
Start: 2025-07-01 | End: 2025-07-01 | Stop reason: HOSPADM

## 2025-07-01 RX ORDER — HYDROCODONE BITARTRATE AND ACETAMINOPHEN 7.5; 325 MG/1; MG/1
1 TABLET ORAL EVERY 6 HOURS PRN
Qty: 6 TABLET | Refills: 0 | Status: SHIPPED | OUTPATIENT
Start: 2025-07-01

## 2025-07-01 RX ORDER — MIDAZOLAM HYDROCHLORIDE 1 MG/ML
1 INJECTION, SOLUTION INTRAMUSCULAR; INTRAVENOUS
Status: DISCONTINUED | OUTPATIENT
Start: 2025-07-01 | End: 2025-07-01 | Stop reason: HOSPADM

## 2025-07-01 RX ORDER — SODIUM CHLORIDE, SODIUM LACTATE, POTASSIUM CHLORIDE, CALCIUM CHLORIDE 600; 310; 30; 20 MG/100ML; MG/100ML; MG/100ML; MG/100ML
100 INJECTION, SOLUTION INTRAVENOUS ONCE AS NEEDED
Status: DISCONTINUED | OUTPATIENT
Start: 2025-07-01 | End: 2025-07-01 | Stop reason: HOSPADM

## 2025-07-01 RX ORDER — MAGNESIUM HYDROXIDE 1200 MG/15ML
LIQUID ORAL AS NEEDED
Status: DISCONTINUED | OUTPATIENT
Start: 2025-07-01 | End: 2025-07-01 | Stop reason: HOSPADM

## 2025-07-01 RX ORDER — SODIUM CHLORIDE 0.9 % (FLUSH) 0.9 %
10 SYRINGE (ML) INJECTION EVERY 12 HOURS SCHEDULED
Status: DISCONTINUED | OUTPATIENT
Start: 2025-07-01 | End: 2025-07-01 | Stop reason: HOSPADM

## 2025-07-01 RX ORDER — HYDROMORPHONE HYDROCHLORIDE 1 MG/ML
0.5 INJECTION, SOLUTION INTRAMUSCULAR; INTRAVENOUS; SUBCUTANEOUS
Status: DISCONTINUED | OUTPATIENT
Start: 2025-07-01 | End: 2025-07-01 | Stop reason: HOSPADM

## 2025-07-01 RX ORDER — SODIUM CHLORIDE, SODIUM LACTATE, POTASSIUM CHLORIDE, CALCIUM CHLORIDE 600; 310; 30; 20 MG/100ML; MG/100ML; MG/100ML; MG/100ML
125 INJECTION, SOLUTION INTRAVENOUS ONCE
Status: COMPLETED | OUTPATIENT
Start: 2025-07-01 | End: 2025-07-01

## 2025-07-01 RX ORDER — MIDAZOLAM HYDROCHLORIDE 1 MG/ML
INJECTION, SOLUTION INTRAMUSCULAR; INTRAVENOUS AS NEEDED
Status: DISCONTINUED | OUTPATIENT
Start: 2025-07-01 | End: 2025-07-01 | Stop reason: SURG

## 2025-07-01 RX ORDER — IPRATROPIUM BROMIDE AND ALBUTEROL SULFATE 2.5; .5 MG/3ML; MG/3ML
3 SOLUTION RESPIRATORY (INHALATION) ONCE AS NEEDED
Status: DISCONTINUED | OUTPATIENT
Start: 2025-07-01 | End: 2025-07-01 | Stop reason: HOSPADM

## 2025-07-01 RX ORDER — FAMOTIDINE 10 MG/ML
INJECTION, SOLUTION INTRAVENOUS AS NEEDED
Status: DISCONTINUED | OUTPATIENT
Start: 2025-07-01 | End: 2025-07-01 | Stop reason: SURG

## 2025-07-01 RX ORDER — ONDANSETRON 2 MG/ML
INJECTION INTRAMUSCULAR; INTRAVENOUS AS NEEDED
Status: DISCONTINUED | OUTPATIENT
Start: 2025-07-01 | End: 2025-07-01 | Stop reason: SURG

## 2025-07-01 RX ORDER — FENTANYL CITRATE 50 UG/ML
50 INJECTION, SOLUTION INTRAMUSCULAR; INTRAVENOUS
Status: COMPLETED | OUTPATIENT
Start: 2025-07-01 | End: 2025-07-01

## 2025-07-01 RX ORDER — MIDAZOLAM HYDROCHLORIDE 1 MG/ML
1 INJECTION, SOLUTION INTRAMUSCULAR; INTRAVENOUS
Status: DISCONTINUED | OUTPATIENT
Start: 2025-07-01 | End: 2025-07-01 | Stop reason: SDUPTHER

## 2025-07-01 RX ORDER — MEPERIDINE HYDROCHLORIDE 25 MG/ML
12.5 INJECTION INTRAMUSCULAR; INTRAVENOUS; SUBCUTANEOUS
Status: DISCONTINUED | OUTPATIENT
Start: 2025-07-01 | End: 2025-07-01 | Stop reason: HOSPADM

## 2025-07-01 RX ORDER — OXYCODONE AND ACETAMINOPHEN 5; 325 MG/1; MG/1
1 TABLET ORAL ONCE AS NEEDED
Status: COMPLETED | OUTPATIENT
Start: 2025-07-01 | End: 2025-07-01

## 2025-07-01 RX ORDER — SODIUM CHLORIDE 9 MG/ML
40 INJECTION, SOLUTION INTRAVENOUS AS NEEDED
Status: DISCONTINUED | OUTPATIENT
Start: 2025-07-01 | End: 2025-07-01 | Stop reason: SDUPTHER

## 2025-07-01 RX ORDER — DEXAMETHASONE SODIUM PHOSPHATE 4 MG/ML
INJECTION, SOLUTION INTRA-ARTICULAR; INTRALESIONAL; INTRAMUSCULAR; INTRAVENOUS; SOFT TISSUE AS NEEDED
Status: DISCONTINUED | OUTPATIENT
Start: 2025-07-01 | End: 2025-07-01 | Stop reason: SURG

## 2025-07-01 RX ORDER — ONDANSETRON 2 MG/ML
4 INJECTION INTRAMUSCULAR; INTRAVENOUS AS NEEDED
Status: DISCONTINUED | OUTPATIENT
Start: 2025-07-01 | End: 2025-07-01 | Stop reason: HOSPADM

## 2025-07-01 RX ORDER — PROPOFOL 10 MG/ML
INJECTION, EMULSION INTRAVENOUS AS NEEDED
Status: DISCONTINUED | OUTPATIENT
Start: 2025-07-01 | End: 2025-07-01 | Stop reason: SURG

## 2025-07-01 RX ORDER — CHLORHEXIDINE GLUCONATE 500 MG/1
CLOTH TOPICAL SEE ADMIN INSTRUCTIONS
Status: DISCONTINUED | OUTPATIENT
Start: 2025-07-01 | End: 2025-07-01 | Stop reason: HOSPADM

## 2025-07-01 RX ORDER — SODIUM CHLORIDE, SODIUM LACTATE, POTASSIUM CHLORIDE, CALCIUM CHLORIDE 600; 310; 30; 20 MG/100ML; MG/100ML; MG/100ML; MG/100ML
125 INJECTION, SOLUTION INTRAVENOUS ONCE
Status: DISCONTINUED | OUTPATIENT
Start: 2025-07-01 | End: 2025-07-01 | Stop reason: SDUPTHER

## 2025-07-01 RX ORDER — SODIUM CHLORIDE 9 MG/ML
40 INJECTION, SOLUTION INTRAVENOUS AS NEEDED
Status: DISCONTINUED | OUTPATIENT
Start: 2025-07-01 | End: 2025-07-01 | Stop reason: HOSPADM

## 2025-07-01 RX ADMIN — FENTANYL CITRATE 50 MCG: 50 INJECTION INTRAMUSCULAR; INTRAVENOUS at 07:53

## 2025-07-01 RX ADMIN — SODIUM CHLORIDE, POTASSIUM CHLORIDE, SODIUM LACTATE AND CALCIUM CHLORIDE: 600; 310; 30; 20 INJECTION, SOLUTION INTRAVENOUS at 07:27

## 2025-07-01 RX ADMIN — FENTANYL CITRATE 50 MCG: 50 INJECTION, SOLUTION INTRAMUSCULAR; INTRAVENOUS at 09:09

## 2025-07-01 RX ADMIN — FENTANYL CITRATE 50 MCG: 50 INJECTION, SOLUTION INTRAMUSCULAR; INTRAVENOUS at 08:57

## 2025-07-01 RX ADMIN — SODIUM CHLORIDE, POTASSIUM CHLORIDE, SODIUM LACTATE AND CALCIUM CHLORIDE 125 ML/HR: 600; 310; 30; 20 INJECTION, SOLUTION INTRAVENOUS at 07:10

## 2025-07-01 RX ADMIN — ONDANSETRON 4 MG: 2 INJECTION, SOLUTION INTRAMUSCULAR; INTRAVENOUS at 07:27

## 2025-07-01 RX ADMIN — PROPOFOL 150 MG: 10 INJECTION, EMULSION INTRAVENOUS at 07:32

## 2025-07-01 RX ADMIN — SODIUM CHLORIDE, POTASSIUM CHLORIDE, SODIUM LACTATE AND CALCIUM CHLORIDE: 600; 310; 30; 20 INJECTION, SOLUTION INTRAVENOUS at 08:31

## 2025-07-01 RX ADMIN — FENTANYL CITRATE 50 MCG: 50 INJECTION INTRAMUSCULAR; INTRAVENOUS at 08:33

## 2025-07-01 RX ADMIN — OXYCODONE HYDROCHLORIDE AND ACETAMINOPHEN 1 TABLET: 5; 325 TABLET ORAL at 09:24

## 2025-07-01 RX ADMIN — FAMOTIDINE 20 MG: 10 INJECTION, SOLUTION INTRAVENOUS at 07:27

## 2025-07-01 RX ADMIN — DEXAMETHASONE SODIUM PHOSPHATE 8 MG: 4 INJECTION, SOLUTION INTRA-ARTICULAR; INTRALESIONAL; INTRAMUSCULAR; INTRAVENOUS; SOFT TISSUE at 07:34

## 2025-07-01 RX ADMIN — FENTANYL CITRATE 50 MCG: 50 INJECTION INTRAMUSCULAR; INTRAVENOUS at 07:27

## 2025-07-01 RX ADMIN — FENTANYL CITRATE 50 MCG: 50 INJECTION INTRAMUSCULAR; INTRAVENOUS at 07:58

## 2025-07-01 RX ADMIN — PROPOFOL 50 MG: 10 INJECTION, EMULSION INTRAVENOUS at 07:53

## 2025-07-01 RX ADMIN — MIDAZOLAM HYDROCHLORIDE 2 MG: 1 INJECTION, SOLUTION INTRAMUSCULAR; INTRAVENOUS at 07:27

## 2025-07-01 RX ADMIN — KETOROLAC TROMETHAMINE 30 MG: 30 INJECTION, SOLUTION INTRAMUSCULAR at 07:34

## 2025-07-01 NOTE — ANESTHESIA PREPROCEDURE EVALUATION
Anesthesia Evaluation     Patient summary reviewed and Nursing notes reviewed   no history of anesthetic complications:   NPO Solid Status: > 8 hours  NPO Liquid Status: > 8 hours           Airway   Mallampati: I  TM distance: >3 FB  Neck ROM: full  No difficulty expected  Dental - normal exam     Pulmonary - negative pulmonary ROS and normal exam   Cardiovascular - negative cardio ROS and normal exam        Neuro/Psych- negative ROS  GI/Hepatic/Renal/Endo - negative ROS     Musculoskeletal     Abdominal  - normal exam    Bowel sounds: normal.   Substance History - negative use     OB/GYN negative ob/gyn ROS         Other   arthritis,                   Anesthesia Plan    ASA 2     general     intravenous induction     Anesthetic plan, risks, benefits, and alternatives have been provided, discussed and informed consent has been obtained with: patient.    CODE STATUS:

## 2025-07-01 NOTE — ANESTHESIA POSTPROCEDURE EVALUATION
Patient: Brennan Lao    Procedure Summary       Date: 07/01/25 Room / Location: [facility] OR [address] [place] [address] COR OR    Anesthesia Start: 0727 Anesthesia Stop: 0844    Procedure: LEFT KNEE ARTHROSCOPY WITH PARTIAL MEDIAL MENISCECTOMY,  CHONDROPLASTY TO MEDIAL FEMORAL CONDYLE (Left: Knee) Diagnosis:       Positive Windy test of left knee, initial encounter      Locking of left knee      (Positive Windy test of left knee, initial encounter [S83.207A])      (Locking of left knee [M23.92])    Surgeons: Sonny Booth MD Provider: Naveed Solis MD    Anesthesia Type: general ASA Status: 2            Anesthesia Type: general    Vitals  Vitals Value Taken Time   /84 07/01/25 09:15   Temp 97.5 °F (36.4 °C) 07/01/25 08:45   Pulse 65 07/01/25 09:18   Resp 11 07/01/25 09:15   SpO2 98 % 07/01/25 09:18   Vitals shown include unfiled device data.        Post Anesthesia Care and Evaluation    Patient location during evaluation: PHASE II  Patient participation: complete - patient participated  Level of consciousness: awake and alert  Pain score: 1  Pain management: adequate    Airway patency: patent  Anesthetic complications: No anesthetic complications  PONV Status: controlled  Cardiovascular status: acceptable  Respiratory status: acceptable  Hydration status: acceptable

## 2025-07-01 NOTE — ANESTHESIA PROCEDURE NOTES
Airway  Reason: elective    Date/Time: 7/1/2025 7:33 AM  Airway not difficult    General Information and Staff    Patient location during procedure: OR  CRNA/CAA: Brandy Khoury CRNA    Indications and Patient Condition  Indications for airway management: airway protection    Preoxygenated: yes  MILS maintained throughout    Mask difficulty assessment: 0 - not attempted    Final Airway Details    Final airway type: supraglottic airway      Successful airway: unique  Size: 4   Number of attempts at approach: 1  Assessment: lips, teeth, and gum same as pre-op

## 2025-07-01 NOTE — OP NOTE
KNEE ARTHROSCOPY WITH CHONDROPLASTY  Procedure Note    Brennan Lao  7/1/2025    Pre-op Diagnosis:   Positive Windy test of left knee, initial encounter [S83.207A]  Locking of left knee [M23.92]    Post-op Diagnosis:     Post-Op Diagnosis Codes:     * Positive Windy test of left knee, initial encounter [S83.207A]     * Locking of left knee [M23.92]           Procedure(s):  LEFT KNEE ARTHROSCOPY WITH PARTIAL MEDIAL MENISCECTOMY,  CHONDROPLASTY    Surgeon(s):  Sonny Booth MD    Anesthesia: General/local    Operative technique: With patient in the operating theatre under general anesthesia with left lower extremity sterilely prepped and draped in the usual manner standard arthroscopy portals created arthroscope was introduced into the lateral portal brought distally patella with mild degenerative changes.  Distal aspect of the trochlea identified to have grade 2 fibrillation over the central portion.  Medial gutter clean medial compartment entered and complex tear posterior horn medial meniscus was identified with probe large horizontal component was divided meniscus tear was removed piecemeal alternating with punch and patellar shaver removing pop and bottom portions of the meniscus tear debriding to labral rim.  Brie was evacuated with patellar shaver.  Mild degenerative changes noted within the central portion of the medial femoral condyle stable by probing.  The compartment entered farming intact ACL lateral compartment pristine lateral gutter clean.  Patellofemoral joint was reentered and the trochlea was gently debrided with 3.5 nonaggressive shaver contouring greater and evacuating debris.  Final sweep through the knee revealed no additional pathology instruments removed water expressed and portals injected with 10 cc 0.5 Marcaine wounds closed with 3-0 nylon with sterile dressing applied he was taken to recovery room in stable condition.    Staff:   Circulator: Chacho Acuña, LUKE  Scrub  Person: Toño Riggins  Assistant: Barb Alberto    Estimated Blood Loss: none    Specimens:   none               Implants/Grafts: none      Drains: None    Complications: none    Tourniquet time: 43   min                      Sonny Booth MD     Date: 7/1/2025  Time: 08:39 EDT    Cc: Rizwana Saha MD

## 2025-07-04 NOTE — H&P
History & Physical        Patient: Brennan Lao  YOB: 1985  Date of Encounter: 06/11/2025           Chief Complaint:       Chief Complaint   Patient presents with    Left Knee - Pain, Edema, Initial Evaluation               HPI:   Brennan Lao, 40 y.o. male presents for evaluation of left knee pain chronic.  He sustained his first knee injury approximately 8 years ago.  He twisted his knee severe pain struggled for several months but eventually improved to where that he could function.  Approximately 4 years ago he reinjured his knee and again symptoms returned and increased in severity.  Approximately 18 months ago he was sustained third injury to his left knee he now describes severe pain unable to bear weight and describes a locking sensation both with ambulation but also at rest frequently when he wakes in the morning he has trouble with his left knee locked and with manipulation he can eventually free his knee.  He has received physical therapy on several occasions without improvement he continues to try to do motion and strengthening exercises.  He has received both intra-articular steroid injection and intra-articular viscous injection both with limited response.  He is unable to undergo MRI left knee because of metal in his eye.  Past medical history is remarkable for multiple injuries to his right knee in the past but he remains functional.            Active Problem List:  Problem List       Patient Active Problem List   Diagnosis    Metal foreign body in head    Primary osteoarthritis of left knee    Chronic pain of left knee    Elevated uric acid in blood    Leukocytosis    Locking of left knee    Windy sign present in left knee                  Past Medical History:  Medical History        Past Medical History:   Diagnosis Date    Ankle sprain N/A     When I played basketball in [employer]    Fracture of ankle N/A    Knee sprain N/A    Low back strain N/A    Stress fracture N/A     Tear of meniscus of knee 2024     Both knees    Tendinitis of knee 2024     Both knee    Wrist sprain       N/a                  Past Surgical History:  Surgical History         Past Surgical History:   Procedure Laterality Date    ACHILLES TENDON SURGERY   N/a     Right ankle    ANKLE OPEN REDUCTION INTERNAL FIXATION   N/A     Right ankle    FOOT SURGERY   N/A    KNEE SURGERY   2018     Right knee                  Family History:        Family History   Problem Relation Age of Onset    Osteoporosis Mother      Cancer Maternal Grandfather              Social History:  Social History   Social History            Socioeconomic History    Marital status: Other   Tobacco Use    Smoking status: Every Day       Current packs/day: 0.25       Average packs/day: 0.3 packs/day for 14.4 years (3.6 ttl pk-yrs)       Types: Cigarettes       Start date: 2011    Smokeless tobacco: Current       Types: Snuff   Vaping Use    Vaping status: Never Used   Substance and Sexual Activity    Alcohol use: Never    Drug use: Never    Sexual activity: Yes       Partners: Female       Birth control/protection: None         Body mass index is 31.1 kg/m².        Medications:  Current Medications          Current Outpatient Medications   Medication Sig Dispense Refill    ibuprofen (ADVIL,MOTRIN) 200 MG tablet Take 1 tablet by mouth Every 6 (Six) Hours As Needed for Mild Pain.        naproxen sodium (ALEVE) 220 MG tablet Take 1 tablet by mouth 2 (Two) Times a Day As Needed.        indomethacin (INDOCIN) 50 MG capsule PRN medicine for gout flares. Take 1 capsule q8h for 3-5 days at onset of gout flare. Discontinue once pain is well controlled. Hold meloxicam while taking indomethacin (Patient not taking: Reported on 5/28/2025) 30 capsule 1    nabumetone (RELAFEN) 500 MG tablet Take 1-2 tablets by mouth 2 (Two) Times a Day As Needed for Moderate Pain. 120 tablet 1      No current facility-administered medications for this visit.                Allergies:  Allergies   No Known Allergies           Review of Systems:   Review of Systems   Constitutional: Negative.  Negative for chills, fatigue and fever.   HENT: Negative.  Negative for congestion, facial swelling, mouth sores, sore throat, trouble swallowing and voice change.    Eyes: Negative.  Negative for pain, discharge and visual disturbance.   Respiratory: Negative.  Negative for apnea, cough, chest tightness, shortness of breath and wheezing.    Cardiovascular: Negative.  Negative for chest pain, palpitations and leg swelling.   Gastrointestinal: Negative.  Negative for abdominal distention, abdominal pain, anal bleeding, constipation, diarrhea, nausea and vomiting.   Endocrine: Negative.  Negative for cold intolerance, heat intolerance, polyphagia and polyuria.   Genitourinary: Negative.  Negative for difficulty urinating, dysuria, flank pain and hematuria.   Musculoskeletal:  Positive for arthralgias and joint swelling.   Skin: Negative.  Negative for color change, pallor, rash and wound.   Allergic/Immunologic: Negative.  Negative for environmental allergies, food allergies and immunocompromised state.   Neurological:  Positive for numbness. Negative for dizziness, tremors, seizures, syncope, facial asymmetry, speech difficulty, weakness, light-headedness and headaches.   Hematological: Negative.  Negative for adenopathy. Does not bruise/bleed easily.   Psychiatric/Behavioral: Negative.  Negative for behavioral problems, confusion, dysphoric mood, self-injury, sleep disturbance and suicidal ideas. The patient is not nervous/anxious.             Physical Exam:   Physical Exam  Vitals and nursing note reviewed.   Constitutional:       General: He is not in acute distress.     Appearance: He is well-developed. He is not diaphoretic.   HENT:      Head: Normocephalic and atraumatic.      Right Ear: External ear normal.      Left Ear: External ear normal.   Eyes:      General:         Right eye: No  "discharge.         Left eye: No discharge.      Conjunctiva/sclera: Conjunctivae normal.      Pupils: Pupils are equal, round, and reactive to light.   Neck:      Thyroid: No thyromegaly.      Vascular: No JVD.   Cardiovascular:      Rate and Rhythm: Normal rate and regular rhythm.      Heart sounds: Normal heart sounds. No murmur heard.     No friction rub. No gallop.   Pulmonary:      Effort: Pulmonary effort is normal. No respiratory distress.      Breath sounds: Normal breath sounds. No wheezing or rales.   Chest:      Chest wall: No tenderness.   Abdominal:      General: Bowel sounds are normal. There is no distension.      Palpations: Abdomen is soft.      Tenderness: There is no guarding.   Musculoskeletal:      Cervical back: Normal range of motion and neck supple.   Lymphadenopathy:      Cervical: No cervical adenopathy.   Skin:     General: Skin is warm and dry.      Capillary Refill: Capillary refill takes less than 2 seconds.   Neurological:      Mental Status: He is alert and oriented to person, place, and time.   Psychiatric:         Behavior: Behavior normal.         Thought Content: Thought content normal.         Judgment: Judgment normal.      GENERAL: 40 y.o. male, alert and oriented X 3 in no acute distress.   Visit Vitals  /85   Pulse 72   Ht 195.6 cm (77.01\")   Wt 119 kg (262 lb 5.6 oz)   BMI 31.10 kg/m²         GENERAL APPEARANCE: Awake, alert & oriented, in no acute distress and well developed, well nourished.   PSYCH: Normal mood and affect  LUNGS: Breathing nonlabored, no wheezing  EYES: Sclera anicteric, pupils equal  CARDIOVASCULAR: Palpable pulses. Capillary refill less than 2 seconds  INTEGUMENTARY: Skin intact, co clubbing, cyanosis  NEUROLOGIC: Normal Sensation  MUSCULOSKELETAL:  Orthopedic Examination:  Right knee demonstrates moderate effusion marked posterior medial joint line tenderness with strongly positive Windy. He has no gross instability with varus valgus stressing " Lachman or drawer his extension is full flexion beyond 90 degrees with moderate pain due to effusion. Neurovascular exam is grossly intact.            Radiology/Labs:     XR Knee 3 View Left  Result Date: 6/11/2025  1.  No fracture or dislocation. 2.  Mild medial knee compartment osteoarthritis.  This report was finalized on 6/11/2025 9:05 AM by Dr. Vaughn Foss MD.       XR Foot 3+ View Left  Result Date: 5/28/2025    No acute findings in the left foot.  This report was finalized on 5/28/2025 10:02 AM by Dr. Chin Lobo MD.       XR Ankle 3+ View Left  Result Date: 5/28/2025    No acute findings in the left ankle.  This report was finalized on 5/28/2025 9:59 AM by Dr. Chin Lobo MD.               Radiographs knee by my review show minimal medial joint space narrowing slight squaring of medial femoral condyle otherwise unremarkable.              Assessment & Plan:   40 y.o. male presents with acute on chronic left knee complaints presentation today strongly suspicious for medial meniscus tear given his description of locking requires manipulation to free and strongly positive Windy.  We discussed options he is here to proceed with proposed left knee arthroscopy partial medial meniscectomy and possible chondroplasty.  We discussed risk and benefits.  He is scheduled [facility] left knee arthroscopic partial medial meniscectomy possible chondroplasty.              Visit Diagnosis       ICD-10-CM ICD-9-CM   1. Positive Windy test of left knee, initial encounter  S83.207A 836.2   2. Locking of left knee  M23.92 717.9               Cc:   Rizwana Saha MD                      This document has been electronically signed by Sonny Booth MD   June 12, 2025 20:24 EDT

## 2025-07-07 ENCOUNTER — OFFICE VISIT (OUTPATIENT)
Dept: ORTHOPEDIC SURGERY | Facility: CLINIC | Age: 40
End: 2025-07-07
Payer: MEDICAID

## 2025-07-07 VITALS — WEIGHT: 249.12 LBS | BODY MASS INDEX: 29.41 KG/M2 | HEIGHT: 77 IN

## 2025-07-07 DIAGNOSIS — Z09 POSTOP CHECK: Primary | ICD-10-CM

## 2025-07-07 PROCEDURE — 99024 POSTOP FOLLOW-UP VISIT: CPT | Performed by: ORTHOPAEDIC SURGERY
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
## (undated) DEVICE — DRSNG WND GZ CURAD OIL EMULSION 3X8IN LF STRL 1PK

## (undated) DEVICE — TBG PUMP ARTHSCP MAIN AR6400 16FT

## (undated) DEVICE — BNDG ELAS CO-FLEX SLF ADHR 4IN5YD LF STRL

## (undated) DEVICE — BLD CUT FORMLA AGGR PLS 4.0MM

## (undated) DEVICE — CUFF TOURNI 1BLADDER 1PRT 34IN STRL

## (undated) DEVICE — NDL SPINE 22G 31/2IN BLK

## (undated) DEVICE — BNDG ELAS MATRX V/CLS 6IN 5YD LF

## (undated) DEVICE — SUT ETHLN 3-0 FS118IN 663H

## (undated) DEVICE — PAD WRAP/ON KN COOL/THERP W/ELAS/STRAP LF

## (undated) DEVICE — HOLDER: Brand: DEROYAL

## (undated) DEVICE — PK KN ARTHSCP 70

## (undated) DEVICE — PATIENT RETURN ELECTRODE, SINGLE-USE, CONTACT QUALITY MONITORING, ADULT, WITH 9FT CORD, FOR PATIENTS WEIGING OVER 33LBS. (15KG): Brand: MEGADYNE

## (undated) DEVICE — SYS COLD/THRP POLAR/CARE/CUBE

## (undated) DEVICE — GLV SURG PREMIERPRO MIC LTX PF SZ8 BRN